# Patient Record
Sex: FEMALE | Race: WHITE | NOT HISPANIC OR LATINO | Employment: PART TIME | URBAN - METROPOLITAN AREA
[De-identification: names, ages, dates, MRNs, and addresses within clinical notes are randomized per-mention and may not be internally consistent; named-entity substitution may affect disease eponyms.]

---

## 2020-03-10 NOTE — PROGRESS NOTES
Subjective:   3/11/2020  7:55 AM  Primary care physician:No primary care provider on file.  Referring Provider: Syeda Andrews MD  Other: Lyle Galicia MD    Chief Complaint:   Chief Complaint   Patient presents with   • New Patient     tumor panceas      Diagnosis:   1. Pancreatic cyst     2. Hepatic cirrhosis, unspecified hepatic cirrhosis type, unspecified whether ascites present (HCC)     3. Abdominal pain, unspecified abdominal location     4. Abnormal CT of the abdomen         History of presenting illness:  BRAVO Cooney  is a pleasant 69 y.o. 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  She had this epigastric pain which she was seen by her primary care physician Dr. Andrews.  Ultrasound was ordered which revealed this large cystic lesion in the head of the pancreas and no sign of cholecystitis.  I have reviewed that ultrasound.  This led to a CT scan which was done at [facility] on January 24, 2020.  I have personally reviewed that imaging and it does appear to be a large cystic mass measuring greater than 4 cm at its greatest dimension.  There is a 5 to 8 mm nodule within the cystic mass that does enhance.  It does appear to be slightly complex.  There may be other areas of thickening around the cyst wall as well.  There is a question of common bile duct obstruction but I am having difficulty seeing the anatomy.  The patient does have some unusual vascular anatomy within the martín and hilum.  I suspect this is related to multiple branches but difficult to assess.  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  In any event, the patient does complain of intermittent epigastric pain radiating to the back consistent with pancreatitis as well.  The patient denies any weight  loss.  She denies any family history of pancreatic cancer or breast cancer.  Her father did have prostate cancer at age of 85.  She denies ever being admitted to the hospital for pancreatitis, she drinks 1 beer a day, she does not smoke.  She has had no other malignancies.  She did undergo an endoscopic ultrasound and the pathology report shows that it is a mucinous tumor but the biopsy of the nodule did not show any dysplasia.  There is no sign of invasive carcinoma.  This was a limited study as well.  The pancreatic duct does appear to be normal size.  She does not have an MRCP.  There is no unusual adenopathy either.  She is here with her  and sister to discuss neck steps.  I have also spoken to her gastroenterologist and Dr. Bruner from radiation oncology at [facility].      No past medical history on file.  No past surgical history on file.  No Known Allergies  Outpatient Encounter Medications as of 3/11/2020   Medication Sig Dispense Refill   • lisinopril (PRINIVIL) 20 MG Tab Take 20 mg by mouth every day.     • metoprolol SR (TOPROL XL) 25 MG TABLET SR 24 HR Take 25 mg by mouth every day.     • simvastatin (ZOCOR) 40 MG Tab Take 40 mg by mouth every evening.     • AMOXICILLIN PO Take 1,000 mg by mouth every day.     • clarithromycin (BIAXIN) 500 MG Tab Take 500 mg by mouth 2 times a day.     • lansoprazole (PREVACID) 30 MG CAPSULE DELAYED RELEASE Take 30 mg by mouth every day.     • Calcium Carb-Cholecalciferol (CALCIUM 500+D3 PO) Take  by mouth.     • lisinopril-hydrochlorothiazide (PRINZIDE) 20-25 MG per tablet Take 1 Tab by mouth every day.       No facility-administered encounter medications on file as of 3/11/2020.      Social History     Socioeconomic History   • Marital status:      Spouse name: Not on file   • Number of children: Not on file   • Years of education: Not on file   • Highest education level: Not on file   Occupational History   • Not on file   Social Needs    "  • Financial resource strain: Not on file   • Food insecurity     Worry: Not on file     Inability: Not on file   • Transportation needs     Medical: Not on file     Non-medical: Not on file   Tobacco Use   • Smoking status: Not on file   Substance and Sexual Activity   • Alcohol use: Not on file   • Drug use: Not on file   • Sexual activity: Not on file   Lifestyle   • Physical activity     Days per week: Not on file     Minutes per session: Not on file   • Stress: Not on file   Relationships   • Social connections     Talks on phone: Not on file     Gets together: Not on file     Attends Mosque service: Not on file     Active member of club or organization: Not on file     Attends meetings of clubs or organizations: Not on file     Relationship status: Not on file   • Intimate partner violence     Fear of current or ex partner: Not on file     Emotionally abused: Not on file     Physically abused: Not on file     Forced sexual activity: Not on file   Other Topics Concern   • Not on file   Social History Narrative   • Not on file      Social History     Tobacco Use   Smoking Status Not on file     Social History     Substance and Sexual Activity   Alcohol Use Not on file     Social History     Substance and Sexual Activity   Drug Use Not on file        No family history on file.  No pertinent family history on file    Review of Systems   Respiratory: Positive for cough.    Gastrointestinal: Positive for abdominal pain.   Skin: Positive for rash.   All other systems reviewed and are negative.       Objective:   /78 (BP Location: Left arm, Patient Position: Sitting, BP Cuff Size: Adult)   Pulse 92   Temp 36.9 °C (98.5 °F) (Temporal)   Ht 1.626 m (5' 4\")   Wt 69.4 kg (153 lb)   SpO2 94%   BMI 26.26 kg/m²     Physical Exam   Constitutional: She is oriented to person, place, and time. She appears well-developed and well-nourished.   HENT:   Head: Normocephalic and atraumatic.   Eyes: Pupils are equal, " round, and reactive to light. Conjunctivae are normal.   Neck: Normal range of motion. Neck supple.   Cardiovascular: Normal rate and regular rhythm.   Pulmonary/Chest: Effort normal and breath sounds normal.   Abdominal: Soft. Bowel sounds are normal.   Musculoskeletal: Normal range of motion.   Neurological: She is alert and oriented to person, place, and time.   Skin: Skin is warm and dry.   Psychiatric: She has a normal mood and affect. Her behavior is normal. Judgment and thought content normal.   Nursing note and vitals reviewed.      Labs:  NA    Imaging:  Per my read, 1/24/20 CT      Pathology: 2/25/20        Procedures: 2/25/20        Diagnosis:     1. Pancreatic cyst     2. Hepatic cirrhosis, unspecified hepatic cirrhosis type, unspecified whether ascites present (HCC)     3. Abdominal pain, unspecified abdominal location     4. Abnormal CT of the abdomen             Medical Decision Making:  Today's Assessment / Status / Plan:     In light of the present findings, the patient has what appears to be a mucinous tumor greater than 3 cm that is symptomatic with mural nodules causing intermittent abdominal pain.  The biopsies did not come back with a dysplasia but there is a fairly large nodule visualized on the CT scan.  It is complex as well.  She fits all criteria for a Whipple resection but my concern is her anatomy.  I am recommending the patient get an MRCP so that we can evaluate the anatomy and its relationship to the pancreatic duct and bile duct.  We spent time discussing what a Whipple was and I have given her information to read about what a Whipple is.  The patient will see me after she completes her MRCP.    I, Dr. Perez have entered, reviewed and confirmed the above diagnoses related to this patient on this date of service, 3/11/2020  7:55 AM.    She agreed and verbalized her agreement and understanding with the current plan. I answered all questions and concerns she has at this time and  advised her to call at any time in the interim with questions or concerns in regards to her care.    Thank you for allowing me to participate in her care, I will continue to follow closely.       Please note that this dictation was created using voice recognition software. I have made every reasonable attempt to correct obvious errors, but I expect that there are errors of grammar and possibly content that I did not discover before finalizing the note.     Thank you for this consultation and allowing me to participate in your patient's care. If I can be of further service please contact my office.

## 2020-03-11 ENCOUNTER — OFFICE VISIT (OUTPATIENT)
Dept: SURGICAL ONCOLOGY | Facility: MEDICAL CENTER | Age: 70
End: 2020-03-11
Payer: COMMERCIAL

## 2020-03-11 VITALS
SYSTOLIC BLOOD PRESSURE: 122 MMHG | BODY MASS INDEX: 26.12 KG/M2 | TEMPERATURE: 98.5 F | OXYGEN SATURATION: 94 % | DIASTOLIC BLOOD PRESSURE: 78 MMHG | HEART RATE: 92 BPM | WEIGHT: 153 LBS | HEIGHT: 64 IN

## 2020-03-11 DIAGNOSIS — K86.2 PANCREATIC CYST: ICD-10-CM

## 2020-03-11 DIAGNOSIS — R93.5 ABNORMAL CT OF THE ABDOMEN: ICD-10-CM

## 2020-03-11 DIAGNOSIS — D49.0 IPMN (INTRADUCTAL PAPILLARY MUCINOUS NEOPLASM): ICD-10-CM

## 2020-03-11 DIAGNOSIS — K86.89 PANCREATIC MASS: ICD-10-CM

## 2020-03-11 DIAGNOSIS — R10.9 ABDOMINAL PAIN, UNSPECIFIED ABDOMINAL LOCATION: ICD-10-CM

## 2020-03-11 PROBLEM — K74.60 HEPATIC CIRRHOSIS (HCC): Status: ACTIVE | Noted: 2020-03-11

## 2020-03-11 PROCEDURE — 99205 OFFICE O/P NEW HI 60 MIN: CPT | Performed by: SURGERY

## 2020-03-11 RX ORDER — METOPROLOL SUCCINATE 25 MG/1
25 TABLET, EXTENDED RELEASE ORAL DAILY
COMMUNITY

## 2020-03-11 RX ORDER — LANSOPRAZOLE 30 MG/1
30 CAPSULE, DELAYED RELEASE ORAL DAILY
COMMUNITY
End: 2020-03-26

## 2020-03-11 RX ORDER — LISINOPRIL AND HYDROCHLOROTHIAZIDE 25; 20 MG/1; MG/1
1 TABLET ORAL DAILY
COMMUNITY

## 2020-03-11 RX ORDER — LISINOPRIL 20 MG/1
20 TABLET ORAL EVERY EVENING
COMMUNITY

## 2020-03-11 RX ORDER — CLARITHROMYCIN 500 MG/1
500 TABLET, COATED ORAL 2 TIMES DAILY
COMMUNITY
End: 2020-03-26

## 2020-03-11 RX ORDER — SIMVASTATIN 40 MG
40 TABLET ORAL NIGHTLY
COMMUNITY

## 2020-03-11 ASSESSMENT — ENCOUNTER SYMPTOMS
ABDOMINAL PAIN: 1
COUGH: 1

## 2020-03-23 NOTE — PROGRESS NOTES
Subjective:   3/24/2020  7:43 AM  Primary care physician:Syeda Andrews M.D.  Referring Provider: Syeda Andrews MD  Other: Lyle Galicia MD       Chief Complaint:   Chief Complaint   Patient presents with   • Follow-Up     [facility] MRCP IPMN     Diagnosis:   1. IPMN (intraductal papillary mucinous neoplasm)     2. Pancreatic mass     3. Abdominal pain, unspecified abdominal location         History of presenting illness:  BRAVO Cooney  is a pleasant 69 y.o. year old female who presented with follow-up MRCP to evaluate the biliary system.  On the MRCP dated March 20, 2020 appears the patient has a 4.3 cm cystic mass in the martín hepatis with a soft tissue component.  Initially it does appear to be arising from the head of the pancreas but this could also represent a choledochocyst that may have had malignant transformation.  There is a large mural nodule measuring almost 2cm arising from the cystic mass which may point again to a choledochocele at the level of the common bile duct which has converted to malignancy and causing this apparent impending obstruction.  There is no sign of metastatic disease or adenopathy.  The patient also had a colonoscopy on March 10, 2020 which revealed a polyp with high-grade dysplasia.  Fortunately her gastroenterologist was able to do a mucosal resection and he feels confident that it is completely resected and not needing a colectomy.  I have personally spoken to the gastroenterologist regarding this concern.  In the meantime she is here with her family to discuss neck steps and surgery.      No past medical history on file.  No past surgical history on file.  No Known Allergies  Outpatient Encounter Medications as of 3/24/2020   Medication Sig Dispense Refill   • lisinopril (PRINIVIL) 20 MG Tab Take 20 mg by mouth every day.     • metoprolol SR (TOPROL XL) 25 MG TABLET SR 24 HR Take 25 mg by mouth every day.     • simvastatin (ZOCOR) 40 MG Tab Take 40 mg by mouth every evening.     •  AMOXICILLIN PO Take 1,000 mg by mouth every day.     • clarithromycin (BIAXIN) 500 MG Tab Take 500 mg by mouth 2 times a day.     • lansoprazole (PREVACID) 30 MG CAPSULE DELAYED RELEASE Take 30 mg by mouth every day.     • Calcium Carb-Cholecalciferol (CALCIUM 500+D3 PO) Take  by mouth.     • lisinopril-hydrochlorothiazide (PRINZIDE) 20-25 MG per tablet Take 1 Tab by mouth every day.       No facility-administered encounter medications on file as of 3/24/2020.      Social History     Socioeconomic History   • Marital status:      Spouse name: Not on file   • Number of children: Not on file   • Years of education: Not on file   • Highest education level: Not on file   Occupational History   • Not on file   Social Needs   • Financial resource strain: Not on file   • Food insecurity     Worry: Not on file     Inability: Not on file   • Transportation needs     Medical: Not on file     Non-medical: Not on file   Tobacco Use   • Smoking status: Not on file   Substance and Sexual Activity   • Alcohol use: Not on file   • Drug use: Not on file   • Sexual activity: Not on file   Lifestyle   • Physical activity     Days per week: Not on file     Minutes per session: Not on file   • Stress: Not on file   Relationships   • Social connections     Talks on phone: Not on file     Gets together: Not on file     Attends Protestant service: Not on file     Active member of club or organization: Not on file     Attends meetings of clubs or organizations: Not on file     Relationship status: Not on file   • Intimate partner violence     Fear of current or ex partner: Not on file     Emotionally abused: Not on file     Physically abused: Not on file     Forced sexual activity: Not on file   Other Topics Concern   • Not on file   Social History Narrative   • Not on file      Social History     Tobacco Use   Smoking Status Not on file     Social History     Substance and Sexual Activity   Alcohol Use Not on file     Social History   "    Substance and Sexual Activity   Drug Use Not on file        No family history on file.  No pertinent family history on file    Review of Systems   Gastrointestinal: Positive for abdominal pain.   All other systems reviewed and are negative.       Objective:   /78 (BP Location: Left arm, Patient Position: Sitting, BP Cuff Size: Adult)   Pulse 88   Temp 36.7 °C (98.1 °F) (Temporal)   Ht 1.626 m (5' 4\")   Wt 68 kg (150 lb)   SpO2 97%   BMI 25.75 kg/m²     Physical Exam   Constitutional: She is oriented to person, place, and time. She appears well-developed and well-nourished.   HENT:   Head: Normocephalic and atraumatic.   Eyes: Pupils are equal, round, and reactive to light. Conjunctivae are normal.   Neck: Normal range of motion. Neck supple.   Cardiovascular: Normal rate and regular rhythm.   Pulmonary/Chest: Effort normal and breath sounds normal.   Abdominal: Soft.   Musculoskeletal: Normal range of motion.   Neurological: She is alert and oriented to person, place, and time.   Skin: Skin is warm and dry.   Psychiatric: She has a normal mood and affect. Her behavior is normal. Judgment and thought content normal.   Nursing note and vitals reviewed.      Labs:  NA    Imaging:  Per my read, MRCP 3/20/20 [facility]        Pathology: 2/25/20          Procedures: 2/25/20            Diagnosis:     1. IPMN (intraductal papillary mucinous neoplasm)     2. Pancreatic mass     3. Abdominal pain, unspecified abdominal location             Medical Decision Making:  Today's Assessment / Status / Plan:     In light of the present findings, I am still unclear about the anatomy and why it appears as though there is a biliary obstruction occurring just below the hilum at the level of the hepatic duct at the insertion of the gallbladder cystic duct.  There is always a concern that this could be harboring a malignancy like gallbladder carcinoma because of the mid duct obstruction but in the background she has this large " cystic lesion with a large mural nodule at the same location.  The biopsies were negative for any sign of malignancy but my concern is it is harboring a malignancy.  The patient would need a Whipple procedure including possible resection all the way up to the hilum.  There is no sign of metastatic disease thus I feel we will go forward with surgery.  I have spoken to her gastroenterologist as well and he feels that he got the high-grade dysplastic polyp out completely and there is no worry about the ascending colon.  We discussed the risks and benefits of a Whipple procedure including bleeding, infection, the possibility of an anastomotic leak at the pancreas of 25%, leak from the biliary and gastric anastomosis of 2 to 4%, hospital stay in 7 and 10 days, the use of an epidural, ICU stay, multiple drains, a mortality of 1 to 2%, finding metastatic disease that is a cold, and finding that we cannot remove this tumor due to malignant involvement of the portal venous system.  The patient understood this and is agreed the above plan.  The patient be scheduled as soon as feasible possibly Monday, March 30, 2020.  We will be requesting to do this surgery at [facility] due to the leapfrog data showing that the surgery must be done at a high volume center by high-volume surgeon.  Also because of the most recent coronavirus changes in the operating room, we are doing all large cancer surgeries at a single institution.  Elective surgeries are not able to be scheduled at [facility].    I, Dr. Perez have entered, reviewed and confirmed the above diagnoses related to this patient on this date of service, 3/24/2020  7:43 AM.    She agreed and verbalized her agreement and understanding with the current plan. I answered all questions and concerns she has at this time and advised her to call at any time in the interim with questions or concerns in regards to her care.    Thank you for  allowing me to participate in her care, I will continue to follow closely.       Please note that this dictation was created using voice recognition software. I have made every reasonable attempt to correct obvious errors, but I expect that there are errors of grammar and possibly content that I did not discover before finalizing the note.     Thank you for this consultation and allowing me to participate in your patient's care. If I can be of further service please contact my office.

## 2020-03-24 ENCOUNTER — OFFICE VISIT (OUTPATIENT)
Dept: SURGICAL ONCOLOGY | Facility: MEDICAL CENTER | Age: 70
End: 2020-03-24
Payer: COMMERCIAL

## 2020-03-24 VITALS
WEIGHT: 150 LBS | OXYGEN SATURATION: 97 % | HEART RATE: 88 BPM | TEMPERATURE: 98.1 F | BODY MASS INDEX: 25.61 KG/M2 | SYSTOLIC BLOOD PRESSURE: 122 MMHG | HEIGHT: 64 IN | DIASTOLIC BLOOD PRESSURE: 78 MMHG

## 2020-03-24 DIAGNOSIS — R93.5 ABNORMAL CT OF THE ABDOMEN: ICD-10-CM

## 2020-03-24 DIAGNOSIS — R10.9 ABDOMINAL PAIN, UNSPECIFIED ABDOMINAL LOCATION: ICD-10-CM

## 2020-03-24 DIAGNOSIS — K86.89 PANCREATIC MASS: ICD-10-CM

## 2020-03-24 DIAGNOSIS — D49.0 IPMN (INTRADUCTAL PAPILLARY MUCINOUS NEOPLASM): ICD-10-CM

## 2020-03-24 PROCEDURE — 99215 OFFICE O/P EST HI 40 MIN: CPT | Performed by: SURGERY

## 2020-03-24 ASSESSMENT — ENCOUNTER SYMPTOMS: ABDOMINAL PAIN: 1

## 2020-03-26 DIAGNOSIS — Z01.812 PRE-OPERATIVE LABORATORY EXAMINATION: ICD-10-CM

## 2020-03-26 DIAGNOSIS — Z01.83 ENCOUNTER FOR BLOOD TYPING: ICD-10-CM

## 2020-03-26 DIAGNOSIS — Z01.810 PRE-OPERATIVE CARDIOVASCULAR EXAMINATION: ICD-10-CM

## 2020-03-26 LAB
ABO GROUP BLD: NORMAL
ALBUMIN SERPL BCP-MCNC: 4.3 G/DL (ref 3.2–4.9)
ALBUMIN/GLOB SERPL: 1.7 G/DL
ALP SERPL-CCNC: 51 U/L (ref 30–99)
ALT SERPL-CCNC: 31 U/L (ref 2–50)
ANION GAP SERPL CALC-SCNC: 12 MMOL/L (ref 7–16)
AST SERPL-CCNC: 29 U/L (ref 12–45)
BASOPHILS # BLD AUTO: 1 % (ref 0–1.8)
BASOPHILS # BLD: 0.04 K/UL (ref 0–0.12)
BILIRUB SERPL-MCNC: 0.5 MG/DL (ref 0.1–1.5)
BLD GP AB SCN SERPL QL: NORMAL
BUN SERPL-MCNC: 9 MG/DL (ref 8–22)
CALCIUM SERPL-MCNC: 9 MG/DL (ref 8.5–10.5)
CHLORIDE SERPL-SCNC: 103 MMOL/L (ref 96–112)
CO2 SERPL-SCNC: 24 MMOL/L (ref 20–33)
CREAT SERPL-MCNC: 0.62 MG/DL (ref 0.5–1.4)
EKG IMPRESSION: NORMAL
EOSINOPHIL # BLD AUTO: 0.11 K/UL (ref 0–0.51)
EOSINOPHIL NFR BLD: 2.6 % (ref 0–6.9)
ERYTHROCYTE [DISTWIDTH] IN BLOOD BY AUTOMATED COUNT: 45.4 FL (ref 35.9–50)
GLOBULIN SER CALC-MCNC: 2.5 G/DL (ref 1.9–3.5)
GLUCOSE SERPL-MCNC: 77 MG/DL (ref 65–99)
HCT VFR BLD AUTO: 38.6 % (ref 37–47)
HGB BLD-MCNC: 13 G/DL (ref 12–16)
IMM GRANULOCYTES # BLD AUTO: 0.01 K/UL (ref 0–0.11)
IMM GRANULOCYTES NFR BLD AUTO: 0.2 % (ref 0–0.9)
INR PPP: 0.96 (ref 0.87–1.13)
LYMPHOCYTES # BLD AUTO: 1.17 K/UL (ref 1–4.8)
LYMPHOCYTES NFR BLD: 28.1 % (ref 22–41)
MCH RBC QN AUTO: 31.4 PG (ref 27–33)
MCHC RBC AUTO-ENTMCNC: 33.7 G/DL (ref 33.6–35)
MCV RBC AUTO: 93.2 FL (ref 81.4–97.8)
MONOCYTES # BLD AUTO: 0.3 K/UL (ref 0–0.85)
MONOCYTES NFR BLD AUTO: 7.2 % (ref 0–13.4)
NEUTROPHILS # BLD AUTO: 2.54 K/UL (ref 2–7.15)
NEUTROPHILS NFR BLD: 60.9 % (ref 44–72)
NRBC # BLD AUTO: 0 K/UL
NRBC BLD-RTO: 0 /100 WBC
PLATELET # BLD AUTO: 276 K/UL (ref 164–446)
PMV BLD AUTO: 10.1 FL (ref 9–12.9)
POTASSIUM SERPL-SCNC: 3.4 MMOL/L (ref 3.6–5.5)
PROT SERPL-MCNC: 6.8 G/DL (ref 6–8.2)
PROTHROMBIN TIME: 13 SEC (ref 12–14.6)
RBC # BLD AUTO: 4.14 M/UL (ref 4.2–5.4)
RH BLD: NORMAL
SODIUM SERPL-SCNC: 139 MMOL/L (ref 135–145)
WBC # BLD AUTO: 4.2 K/UL (ref 4.8–10.8)

## 2020-03-26 PROCEDURE — 36415 COLL VENOUS BLD VENIPUNCTURE: CPT

## 2020-03-26 PROCEDURE — 86901 BLOOD TYPING SEROLOGIC RH(D): CPT

## 2020-03-26 PROCEDURE — 93010 ELECTROCARDIOGRAM REPORT: CPT | Performed by: INTERNAL MEDICINE

## 2020-03-26 PROCEDURE — 85025 COMPLETE CBC W/AUTO DIFF WBC: CPT

## 2020-03-26 PROCEDURE — 85610 PROTHROMBIN TIME: CPT

## 2020-03-26 PROCEDURE — 80053 COMPREHEN METABOLIC PANEL: CPT

## 2020-03-26 PROCEDURE — 86900 BLOOD TYPING SEROLOGIC ABO: CPT

## 2020-03-26 PROCEDURE — 93005 ELECTROCARDIOGRAM TRACING: CPT

## 2020-03-26 PROCEDURE — 86850 RBC ANTIBODY SCREEN: CPT

## 2020-03-26 RX ORDER — M-VIT,TX,IRON,MINS/CALC/FOLIC 27MG-0.4MG
1 TABLET ORAL EVERY EVENING
Status: ON HOLD | COMMUNITY
End: 2020-04-03

## 2020-03-29 NOTE — PROGRESS NOTES
COVID-19 Pre-surgery screenin. Do you have an undiagnosed respiratory illness or symptoms such as coughing or sneezing?  (Yes/No) NO  a. Onset of Sx   b. Acute vs. chronic respiratory illness     2. Do you have an unexplained fever greater than 100.4 degrees Fahrenheit or 38 degrees Celsius?     NO (Yes/No)    3. Have you had direct exposure to a patient who tested positive for Covid-19?    NO (Yes/No)    4. Have you traveled within the last 14 days to [place], [place], [place], [place], or [place]?    NO (Yes/No)    Pt informed of visitor policy

## 2020-03-30 ENCOUNTER — HOSPITAL ENCOUNTER (OUTPATIENT)
Facility: MEDICAL CENTER | Age: 70
End: 2020-03-30
Attending: SURGERY | Admitting: SURGERY
Payer: COMMERCIAL

## 2020-03-30 ENCOUNTER — ANESTHESIA (OUTPATIENT)
Dept: SURGERY | Facility: MEDICAL CENTER | Age: 70
End: 2020-03-30
Payer: COMMERCIAL

## 2020-03-30 ENCOUNTER — ANESTHESIA EVENT (OUTPATIENT)
Dept: SURGERY | Facility: MEDICAL CENTER | Age: 70
End: 2020-03-30
Payer: COMMERCIAL

## 2020-03-30 VITALS
WEIGHT: 149.47 LBS | TEMPERATURE: 97.9 F | DIASTOLIC BLOOD PRESSURE: 87 MMHG | BODY MASS INDEX: 25.52 KG/M2 | RESPIRATION RATE: 16 BRPM | HEART RATE: 80 BPM | OXYGEN SATURATION: 95 % | HEIGHT: 64 IN | SYSTOLIC BLOOD PRESSURE: 137 MMHG

## 2020-03-30 LAB — ABO + RH BLD: NORMAL

## 2020-03-30 PROCEDURE — 700105 HCHG RX REV CODE 258: Performed by: SURGERY

## 2020-03-30 PROCEDURE — 700101 HCHG RX REV CODE 250: Performed by: SURGERY

## 2020-03-30 RX ORDER — SODIUM CHLORIDE, SODIUM LACTATE, POTASSIUM CHLORIDE, CALCIUM CHLORIDE 600; 310; 30; 20 MG/100ML; MG/100ML; MG/100ML; MG/100ML
INJECTION, SOLUTION INTRAVENOUS CONTINUOUS
Status: DISCONTINUED | OUTPATIENT
Start: 2020-03-30 | End: 2020-03-30 | Stop reason: HOSPADM

## 2020-03-30 RX ADMIN — LIDOCAINE HYDROCHLORIDE 0.5 ML: 10 INJECTION, SOLUTION EPIDURAL; INFILTRATION; INTRACAUDAL at 06:07

## 2020-03-30 RX ADMIN — SODIUM CHLORIDE, POTASSIUM CHLORIDE, SODIUM LACTATE AND CALCIUM CHLORIDE: 600; 310; 30; 20 INJECTION, SOLUTION INTRAVENOUS at 06:08

## 2020-03-30 ASSESSMENT — FIBROSIS 4 INDEX: FIB4 SCORE: 1.32

## 2020-03-30 NOTE — OR NURSING
0715 - Authorization not received for surgery.  Dr. FELDER in speaking with pt; will contact pt to reschedule surgery date and time.    0725 - States she would like to wait downstairs for her . Pt walked down to Aydin sharif by Domenica ARNOLD. No further needs.

## 2020-03-30 NOTE — ANESTHESIA PREPROCEDURE EVALUATION
Relevant Problems   No relevant active problems       Physical Exam    Airway   Mallampati: II  TM distance: >3 FB  Neck ROM: full       Cardiovascular - normal exam  Rhythm: regular  Rate: normal  (-) murmur     Dental - normal exam         Pulmonary - normal exam  Breath sounds clear to auscultation     Abdominal    Neurological - normal exam                 Anesthesia Plan    ASA 2       Plan - general and epidural   Neuraxial block will be post-op pain control    Airway plan will be ETT        Induction: intravenous    Postoperative Plan: Postoperative administration of opioids is intended.    Pertinent diagnostic labs and testing reviewed    Informed Consent:    Anesthetic plan and risks discussed with patient.    Use of blood products discussed with: patient whom consented to blood products.

## 2020-04-03 ENCOUNTER — HOSPITAL ENCOUNTER (INPATIENT)
Facility: MEDICAL CENTER | Age: 70
LOS: 8 days | DRG: 406 | End: 2020-04-11
Attending: SURGERY | Admitting: SURGERY
Payer: COMMERCIAL

## 2020-04-03 ENCOUNTER — ANESTHESIA (OUTPATIENT)
Dept: SURGERY | Facility: MEDICAL CENTER | Age: 70
DRG: 406 | End: 2020-04-03
Payer: COMMERCIAL

## 2020-04-03 ENCOUNTER — ANESTHESIA EVENT (OUTPATIENT)
Dept: SURGERY | Facility: MEDICAL CENTER | Age: 70
DRG: 406 | End: 2020-04-03
Payer: COMMERCIAL

## 2020-04-03 DIAGNOSIS — C22.1 CHOLANGIOCARCINOMA OF BILIARY TRACT (HCC): ICD-10-CM

## 2020-04-03 PROBLEM — D37.8 NEOPLASM OF UNCERTAIN BEHAVIOR OF HEAD OF PANCREAS: Status: ACTIVE | Noted: 2020-04-03

## 2020-04-03 LAB
ABO GROUP BLD: NORMAL
BLD GP AB SCN SERPL QL: NORMAL
GRAM STN SPEC: NORMAL
PATHOLOGY CONSULT NOTE: NORMAL
RH BLD: NORMAL
SIGNIFICANT IND 70042: NORMAL
SITE SITE: NORMAL
SOURCE SOURCE: NORMAL

## 2020-04-03 PROCEDURE — 160042 HCHG SURGERY MINUTES - EA ADDL 1 MIN LEVEL 5: Performed by: SURGERY

## 2020-04-03 PROCEDURE — C2617 STENT, NON-COR, TEM W/O DEL: HCPCS | Performed by: SURGERY

## 2020-04-03 PROCEDURE — 500389 HCHG DRAIN, RESERVOIR SUCT JP 100CC: Performed by: SURGERY

## 2020-04-03 PROCEDURE — 88307 TISSUE EXAM BY PATHOLOGIST: CPT | Mod: 59

## 2020-04-03 PROCEDURE — A6402 STERILE GAUZE <= 16 SQ IN: HCPCS | Performed by: SURGERY

## 2020-04-03 PROCEDURE — 501838 HCHG SUTURE GENERAL: Performed by: SURGERY

## 2020-04-03 PROCEDURE — 700105 HCHG RX REV CODE 258: Performed by: SURGERY

## 2020-04-03 PROCEDURE — 700105 HCHG RX REV CODE 258: Performed by: ANESTHESIOLOGY

## 2020-04-03 PROCEDURE — 160048 HCHG OR STATISTICAL LEVEL 1-5: Performed by: SURGERY

## 2020-04-03 PROCEDURE — 86850 RBC ANTIBODY SCREEN: CPT

## 2020-04-03 PROCEDURE — 160009 HCHG ANES TIME/MIN: Performed by: SURGERY

## 2020-04-03 PROCEDURE — 160035 HCHG PACU - 1ST 60 MINS PHASE I: Performed by: SURGERY

## 2020-04-03 PROCEDURE — 501445 HCHG STAPLER, SKIN DISP: Performed by: SURGERY

## 2020-04-03 PROCEDURE — 0FBG0ZZ EXCISION OF PANCREAS, OPEN APPROACH: ICD-10-PCS | Performed by: SURGERY

## 2020-04-03 PROCEDURE — 0DT90ZZ RESECTION OF DUODENUM, OPEN APPROACH: ICD-10-PCS | Performed by: SURGERY

## 2020-04-03 PROCEDURE — A6404 STERILE GAUZE > 48 SQ IN: HCPCS | Performed by: SURGERY

## 2020-04-03 PROCEDURE — 86900 BLOOD TYPING SEROLOGIC ABO: CPT

## 2020-04-03 PROCEDURE — 87205 SMEAR GRAM STAIN: CPT

## 2020-04-03 PROCEDURE — 500380 HCHG DRAIN, PENROSE 1/4X12: Performed by: SURGERY

## 2020-04-03 PROCEDURE — 07BD0ZZ EXCISION OF AORTIC LYMPHATIC, OPEN APPROACH: ICD-10-PCS | Performed by: SURGERY

## 2020-04-03 PROCEDURE — 500378 HCHG DRAIN, J-VAC ROUND 19FR: Performed by: SURGERY

## 2020-04-03 PROCEDURE — 700101 HCHG RX REV CODE 250: Performed by: SURGERY

## 2020-04-03 PROCEDURE — P9047 ALBUMIN (HUMAN), 25%, 50ML: HCPCS | Performed by: SURGERY

## 2020-04-03 PROCEDURE — 0DB60ZZ EXCISION OF STOMACH, OPEN APPROACH: ICD-10-PCS | Performed by: SURGERY

## 2020-04-03 PROCEDURE — 62322 NJX INTERLAMINAR LMBR/SAC: CPT | Performed by: SURGERY

## 2020-04-03 PROCEDURE — 87075 CULTR BACTERIA EXCEPT BLOOD: CPT

## 2020-04-03 PROCEDURE — 0FT40ZZ RESECTION OF GALLBLADDER, OPEN APPROACH: ICD-10-PCS | Performed by: SURGERY

## 2020-04-03 PROCEDURE — C9113 INJ PANTOPRAZOLE SODIUM, VIA: HCPCS | Performed by: SURGERY

## 2020-04-03 PROCEDURE — 86901 BLOOD TYPING SEROLOGIC RH(D): CPT

## 2020-04-03 PROCEDURE — 160002 HCHG RECOVERY MINUTES (STAT): Performed by: SURGERY

## 2020-04-03 PROCEDURE — 0FB90ZZ EXCISION OF COMMON BILE DUCT, OPEN APPROACH: ICD-10-PCS | Performed by: SURGERY

## 2020-04-03 PROCEDURE — 88304 TISSUE EXAM BY PATHOLOGIST: CPT

## 2020-04-03 PROCEDURE — 48150 PARTIAL REMOVAL OF PANCREAS: CPT | Performed by: SURGERY

## 2020-04-03 PROCEDURE — 88305 TISSUE EXAM BY PATHOLOGIST: CPT

## 2020-04-03 PROCEDURE — P9045 ALBUMIN (HUMAN), 5%, 250 ML: HCPCS | Performed by: ANESTHESIOLOGY

## 2020-04-03 PROCEDURE — 770022 HCHG ROOM/CARE - ICU (200)

## 2020-04-03 PROCEDURE — 700111 HCHG RX REV CODE 636 W/ 250 OVERRIDE (IP): Performed by: SURGERY

## 2020-04-03 PROCEDURE — 700111 HCHG RX REV CODE 636 W/ 250 OVERRIDE (IP): Performed by: ANESTHESIOLOGY

## 2020-04-03 PROCEDURE — 700101 HCHG RX REV CODE 250: Performed by: ANESTHESIOLOGY

## 2020-04-03 PROCEDURE — 160031 HCHG SURGERY MINUTES - 1ST 30 MINS LEVEL 5: Performed by: SURGERY

## 2020-04-03 PROCEDURE — 0FB00ZZ EXCISION OF LIVER, OPEN APPROACH: ICD-10-PCS | Performed by: SURGERY

## 2020-04-03 PROCEDURE — 501435 HCHG STAPLER, LINEAR 60: Performed by: SURGERY

## 2020-04-03 PROCEDURE — 99233 SBSQ HOSP IP/OBS HIGH 50: CPT | Performed by: SURGERY

## 2020-04-03 PROCEDURE — 51798 US URINE CAPACITY MEASURE: CPT

## 2020-04-03 PROCEDURE — 88331 PATH CONSLTJ SURG 1 BLK 1SPC: CPT | Mod: 91

## 2020-04-03 PROCEDURE — 76998 US GUIDE INTRAOP: CPT | Mod: 26 | Performed by: SURGERY

## 2020-04-03 PROCEDURE — 502240 HCHG MISC OR SUPPLY RC 0272: Performed by: SURGERY

## 2020-04-03 PROCEDURE — 501443 HCHG STAPLER, ROTIC. FLEX: Performed by: SURGERY

## 2020-04-03 PROCEDURE — C1725 CATH, TRANSLUMIN NON-LASER: HCPCS | Performed by: SURGERY

## 2020-04-03 PROCEDURE — 38747 REMOVE ABDOMINAL LYMPH NODES: CPT | Mod: 59 | Performed by: SURGERY

## 2020-04-03 PROCEDURE — 88309 TISSUE EXAM BY PATHOLOGIST: CPT | Mod: 59

## 2020-04-03 PROCEDURE — 87070 CULTURE OTHR SPECIMN AEROBIC: CPT

## 2020-04-03 PROCEDURE — 47100 WEDGE BIOPSY OF LIVER: CPT | Mod: 51 | Performed by: SURGERY

## 2020-04-03 PROCEDURE — 49905 OMENTAL FLAP INTRA-ABDOM: CPT | Performed by: SURGERY

## 2020-04-03 DEVICE — IMPLANTABLE DEVICE: Type: IMPLANTABLE DEVICE | Site: ABDOMEN | Status: FUNCTIONAL

## 2020-04-03 RX ORDER — ALBUMIN, HUMAN INJ 5% 5 %
SOLUTION INTRAVENOUS PRN
Status: DISCONTINUED | OUTPATIENT
Start: 2020-04-03 | End: 2020-04-03 | Stop reason: SURG

## 2020-04-03 RX ORDER — HYDRALAZINE HYDROCHLORIDE 20 MG/ML
5 INJECTION INTRAMUSCULAR; INTRAVENOUS
Status: DISCONTINUED | OUTPATIENT
Start: 2020-04-03 | End: 2020-04-03 | Stop reason: HOSPADM

## 2020-04-03 RX ORDER — ACETAMINOPHEN 500 MG
1000 TABLET ORAL EVERY 6 HOURS
Status: DISCONTINUED | OUTPATIENT
Start: 2020-04-03 | End: 2020-04-03

## 2020-04-03 RX ORDER — HALOPERIDOL 5 MG/ML
1 INJECTION INTRAMUSCULAR EVERY 6 HOURS PRN
Status: DISCONTINUED | OUTPATIENT
Start: 2020-04-03 | End: 2020-04-07

## 2020-04-03 RX ORDER — DIPHENHYDRAMINE HCL 25 MG
12.5 TABLET ORAL EVERY 6 HOURS PRN
Status: DISCONTINUED | OUTPATIENT
Start: 2020-04-03 | End: 2020-04-03

## 2020-04-03 RX ORDER — SODIUM CHLORIDE, SODIUM LACTATE, POTASSIUM CHLORIDE, CALCIUM CHLORIDE 600; 310; 30; 20 MG/100ML; MG/100ML; MG/100ML; MG/100ML
INJECTION, SOLUTION INTRAVENOUS CONTINUOUS
Status: DISCONTINUED | OUTPATIENT
Start: 2020-04-03 | End: 2020-04-07

## 2020-04-03 RX ORDER — LIDOCAINE HYDROCHLORIDE AND EPINEPHRINE 15; 5 MG/ML; UG/ML
INJECTION, SOLUTION EPIDURAL
Status: COMPLETED | OUTPATIENT
Start: 2020-04-03 | End: 2020-04-03

## 2020-04-03 RX ORDER — ONDANSETRON 2 MG/ML
4 INJECTION INTRAMUSCULAR; INTRAVENOUS
Status: DISCONTINUED | OUTPATIENT
Start: 2020-04-03 | End: 2020-04-03 | Stop reason: HOSPADM

## 2020-04-03 RX ORDER — PHENYLEPHRINE HYDROCHLORIDE 10 MG/ML
INJECTION, SOLUTION INTRAMUSCULAR; INTRAVENOUS; SUBCUTANEOUS PRN
Status: DISCONTINUED | OUTPATIENT
Start: 2020-04-03 | End: 2020-04-03 | Stop reason: SURG

## 2020-04-03 RX ORDER — ALBUMIN (HUMAN) 12.5 G/50ML
12.5 SOLUTION INTRAVENOUS ONCE
Status: COMPLETED | OUTPATIENT
Start: 2020-04-03 | End: 2020-04-03

## 2020-04-03 RX ORDER — KETOROLAC TROMETHAMINE 30 MG/ML
INJECTION, SOLUTION INTRAMUSCULAR; INTRAVENOUS PRN
Status: DISCONTINUED | OUTPATIENT
Start: 2020-04-03 | End: 2020-04-03 | Stop reason: SURG

## 2020-04-03 RX ORDER — DEXAMETHASONE SODIUM PHOSPHATE 4 MG/ML
4 INJECTION, SOLUTION INTRA-ARTICULAR; INTRALESIONAL; INTRAMUSCULAR; INTRAVENOUS; SOFT TISSUE
Status: DISCONTINUED | OUTPATIENT
Start: 2020-04-03 | End: 2020-04-06

## 2020-04-03 RX ORDER — SCOLOPAMINE TRANSDERMAL SYSTEM 1 MG/1
1 PATCH, EXTENDED RELEASE TRANSDERMAL
Status: DISCONTINUED | OUTPATIENT
Start: 2020-04-03 | End: 2020-04-07

## 2020-04-03 RX ORDER — CEFOTETAN DISODIUM 2 G/20ML
INJECTION, POWDER, FOR SOLUTION INTRAMUSCULAR; INTRAVENOUS PRN
Status: DISCONTINUED | OUTPATIENT
Start: 2020-04-03 | End: 2020-04-03 | Stop reason: SURG

## 2020-04-03 RX ORDER — PANTOPRAZOLE SODIUM 40 MG/10ML
40 INJECTION, POWDER, LYOPHILIZED, FOR SOLUTION INTRAVENOUS DAILY
Status: DISCONTINUED | OUTPATIENT
Start: 2020-04-03 | End: 2020-04-06

## 2020-04-03 RX ORDER — METOPROLOL TARTRATE 1 MG/ML
5 INJECTION, SOLUTION INTRAVENOUS
Status: DISCONTINUED | OUTPATIENT
Start: 2020-04-03 | End: 2020-04-03 | Stop reason: HOSPADM

## 2020-04-03 RX ORDER — SODIUM CHLORIDE, SODIUM LACTATE, POTASSIUM CHLORIDE, CALCIUM CHLORIDE 600; 310; 30; 20 MG/100ML; MG/100ML; MG/100ML; MG/100ML
INJECTION, SOLUTION INTRAVENOUS CONTINUOUS
Status: ACTIVE | OUTPATIENT
Start: 2020-04-03 | End: 2020-04-03

## 2020-04-03 RX ORDER — DEXAMETHASONE SODIUM PHOSPHATE 4 MG/ML
INJECTION, SOLUTION INTRA-ARTICULAR; INTRALESIONAL; INTRAMUSCULAR; INTRAVENOUS; SOFT TISSUE PRN
Status: DISCONTINUED | OUTPATIENT
Start: 2020-04-03 | End: 2020-04-03 | Stop reason: SURG

## 2020-04-03 RX ORDER — MEPERIDINE HYDROCHLORIDE 25 MG/ML
12.5 INJECTION INTRAMUSCULAR; INTRAVENOUS; SUBCUTANEOUS
Status: DISCONTINUED | OUTPATIENT
Start: 2020-04-03 | End: 2020-04-03 | Stop reason: HOSPADM

## 2020-04-03 RX ORDER — SODIUM CHLORIDE, SODIUM LACTATE, POTASSIUM CHLORIDE, CALCIUM CHLORIDE 600; 310; 30; 20 MG/100ML; MG/100ML; MG/100ML; MG/100ML
INJECTION, SOLUTION INTRAVENOUS CONTINUOUS
Status: DISCONTINUED | OUTPATIENT
Start: 2020-04-03 | End: 2020-04-03 | Stop reason: HOSPADM

## 2020-04-03 RX ORDER — ACETAMINOPHEN 650 MG/1
975 SUPPOSITORY RECTAL EVERY 6 HOURS
Status: DISCONTINUED | OUTPATIENT
Start: 2020-04-03 | End: 2020-04-11 | Stop reason: HOSPADM

## 2020-04-03 RX ORDER — ONDANSETRON 2 MG/ML
4 INJECTION INTRAMUSCULAR; INTRAVENOUS EVERY 4 HOURS PRN
Status: DISCONTINUED | OUTPATIENT
Start: 2020-04-03 | End: 2020-04-11 | Stop reason: HOSPADM

## 2020-04-03 RX ORDER — ONDANSETRON 2 MG/ML
INJECTION INTRAMUSCULAR; INTRAVENOUS PRN
Status: DISCONTINUED | OUTPATIENT
Start: 2020-04-03 | End: 2020-04-03 | Stop reason: SURG

## 2020-04-03 RX ORDER — SODIUM CHLORIDE 9 MG/ML
250 INJECTION, SOLUTION INTRAVENOUS ONCE
Status: COMPLETED | OUTPATIENT
Start: 2020-04-03 | End: 2020-04-03

## 2020-04-03 RX ORDER — SODIUM CHLORIDE, SODIUM LACTATE, POTASSIUM CHLORIDE, AND CALCIUM CHLORIDE .6; .31; .03; .02 G/100ML; G/100ML; G/100ML; G/100ML
250 INJECTION, SOLUTION INTRAVENOUS PRN
Status: DISCONTINUED | OUTPATIENT
Start: 2020-04-03 | End: 2020-04-07

## 2020-04-03 RX ORDER — DIPHENHYDRAMINE HYDROCHLORIDE 50 MG/ML
12.5 INJECTION INTRAMUSCULAR; INTRAVENOUS
Status: DISCONTINUED | OUTPATIENT
Start: 2020-04-03 | End: 2020-04-03 | Stop reason: HOSPADM

## 2020-04-03 RX ORDER — METOPROLOL TARTRATE 1 MG/ML
5 INJECTION, SOLUTION INTRAVENOUS
Status: DISCONTINUED | OUTPATIENT
Start: 2020-04-03 | End: 2020-04-08

## 2020-04-03 RX ORDER — DIPHENHYDRAMINE HYDROCHLORIDE 50 MG/ML
25 INJECTION INTRAMUSCULAR; INTRAVENOUS EVERY 6 HOURS PRN
Status: DISCONTINUED | OUTPATIENT
Start: 2020-04-03 | End: 2020-04-07

## 2020-04-03 RX ORDER — METOPROLOL TARTRATE 1 MG/ML
1 INJECTION, SOLUTION INTRAVENOUS
Status: DISCONTINUED | OUTPATIENT
Start: 2020-04-03 | End: 2020-04-03

## 2020-04-03 RX ORDER — HALOPERIDOL 5 MG/ML
1 INJECTION INTRAMUSCULAR
Status: DISCONTINUED | OUTPATIENT
Start: 2020-04-03 | End: 2020-04-03 | Stop reason: HOSPADM

## 2020-04-03 RX ORDER — DIPHENHYDRAMINE HYDROCHLORIDE 50 MG/ML
12.5 INJECTION INTRAMUSCULAR; INTRAVENOUS EVERY 6 HOURS PRN
Status: DISCONTINUED | OUTPATIENT
Start: 2020-04-03 | End: 2020-04-07

## 2020-04-03 RX ADMIN — HYDROMORPHONE HYDROCHLORIDE: 1 INJECTION, SOLUTION INTRAMUSCULAR; INTRAVENOUS; SUBCUTANEOUS at 11:49

## 2020-04-03 RX ADMIN — LIDOCAINE HYDROCHLORIDE,EPINEPHRINE BITARTRATE 3 ML: 15; .005 INJECTION, SOLUTION EPIDURAL; INFILTRATION; INTRACAUDAL; PERINEURAL at 07:44

## 2020-04-03 RX ADMIN — PIPERACILLIN SODIUM AND TAZOBACTAM SODIUM 3.38 G: 3; .375 INJECTION, POWDER, FOR SOLUTION INTRAVENOUS at 14:17

## 2020-04-03 RX ADMIN — PANTOPRAZOLE SODIUM 40 MG: 40 INJECTION, POWDER, LYOPHILIZED, FOR SOLUTION INTRAVENOUS at 14:17

## 2020-04-03 RX ADMIN — ALBUMIN (HUMAN) 250 ML: 2.5 SOLUTION INTRAVENOUS at 10:59

## 2020-04-03 RX ADMIN — FENTANYL CITRATE 100 MCG: 50 INJECTION, SOLUTION INTRAMUSCULAR; INTRAVENOUS at 08:15

## 2020-04-03 RX ADMIN — PHENYLEPHRINE HYDROCHLORIDE 100 MCG: 10 INJECTION INTRAVENOUS at 10:41

## 2020-04-03 RX ADMIN — ROCURONIUM BROMIDE 30 MG: 10 INJECTION, SOLUTION INTRAVENOUS at 08:02

## 2020-04-03 RX ADMIN — FENTANYL CITRATE 50 MCG: 50 INJECTION, SOLUTION INTRAMUSCULAR; INTRAVENOUS at 08:19

## 2020-04-03 RX ADMIN — FENTANYL CITRATE 50 MCG: 50 INJECTION, SOLUTION INTRAMUSCULAR; INTRAVENOUS at 07:30

## 2020-04-03 RX ADMIN — DEXAMETHASONE SODIUM PHOSPHATE 4 MG: 4 INJECTION, SOLUTION INTRA-ARTICULAR; INTRALESIONAL; INTRAMUSCULAR; INTRAVENOUS; SOFT TISSUE at 09:59

## 2020-04-03 RX ADMIN — CEFOTETAN DISODIUM 2 G: 2 INJECTION, POWDER, FOR SOLUTION INTRAMUSCULAR; INTRAVENOUS at 07:08

## 2020-04-03 RX ADMIN — SODIUM CHLORIDE 250 ML: 9 INJECTION, SOLUTION INTRAVENOUS at 20:44

## 2020-04-03 RX ADMIN — LIDOCAINE HYDROCHLORIDE 0.5 ML: 10 INJECTION, SOLUTION EPIDURAL; INFILTRATION; INTRACAUDAL; PERINEURAL at 06:26

## 2020-04-03 RX ADMIN — ROCURONIUM BROMIDE 20 MG: 10 INJECTION, SOLUTION INTRAVENOUS at 10:02

## 2020-04-03 RX ADMIN — PHENYLEPHRINE HYDROCHLORIDE 100 MCG: 10 INJECTION INTRAVENOUS at 11:05

## 2020-04-03 RX ADMIN — FENTANYL CITRATE 50 MCG: 50 INJECTION, SOLUTION INTRAMUSCULAR; INTRAVENOUS at 09:16

## 2020-04-03 RX ADMIN — KETOROLAC TROMETHAMINE 30 MG: 30 INJECTION, SOLUTION INTRAMUSCULAR at 11:14

## 2020-04-03 RX ADMIN — ROCURONIUM BROMIDE 30 MG: 10 INJECTION, SOLUTION INTRAVENOUS at 09:10

## 2020-04-03 RX ADMIN — PIPERACILLIN SODIUM AND TAZOBACTAM SODIUM 3.38 G: 3; .375 INJECTION, POWDER, FOR SOLUTION INTRAVENOUS at 17:42

## 2020-04-03 RX ADMIN — FENTANYL CITRATE 100 MCG: 50 INJECTION, SOLUTION INTRAMUSCULAR; INTRAVENOUS at 09:52

## 2020-04-03 RX ADMIN — MIDAZOLAM HYDROCHLORIDE 2 MG: 1 INJECTION, SOLUTION INTRAMUSCULAR; INTRAVENOUS at 07:09

## 2020-04-03 RX ADMIN — ALBUMIN (HUMAN) 12.5 G: 5 SOLUTION INTRAVENOUS at 20:32

## 2020-04-03 RX ADMIN — ONDANSETRON 4 MG: 2 INJECTION INTRAMUSCULAR; INTRAVENOUS at 09:59

## 2020-04-03 RX ADMIN — PROPOFOL 200 MG: 10 INJECTION, EMULSION INTRAVENOUS at 07:30

## 2020-04-03 RX ADMIN — ROCURONIUM BROMIDE 50 MG: 10 INJECTION, SOLUTION INTRAVENOUS at 07:33

## 2020-04-03 RX ADMIN — SUGAMMADEX 400 MG: 100 INJECTION, SOLUTION INTRAVENOUS at 11:14

## 2020-04-03 RX ADMIN — PHENYLEPHRINE HYDROCHLORIDE 100 MCG: 10 INJECTION INTRAVENOUS at 07:41

## 2020-04-03 RX ADMIN — ROCURONIUM BROMIDE 20 MG: 10 INJECTION, SOLUTION INTRAVENOUS at 11:01

## 2020-04-03 RX ADMIN — Medication 100 MG: at 07:31

## 2020-04-03 RX ADMIN — SODIUM CHLORIDE, POTASSIUM CHLORIDE, SODIUM LACTATE AND CALCIUM CHLORIDE: 600; 310; 30; 20 INJECTION, SOLUTION INTRAVENOUS at 07:08

## 2020-04-03 RX ADMIN — SODIUM CHLORIDE, POTASSIUM CHLORIDE, SODIUM LACTATE AND CALCIUM CHLORIDE: 600; 310; 30; 20 INJECTION, SOLUTION INTRAVENOUS at 06:26

## 2020-04-03 ASSESSMENT — ENCOUNTER SYMPTOMS
NAUSEA: 0
DIZZINESS: 0
SHORTNESS OF BREATH: 0
ABDOMINAL PAIN: 1
WHEEZING: 0
CHEST TIGHTNESS: 0
WEAKNESS: 0
VOMITING: 0

## 2020-04-03 ASSESSMENT — FIBROSIS 4 INDEX: FIB4 SCORE: 1.32

## 2020-04-03 ASSESSMENT — COPD QUESTIONNAIRES
COPD SCREENING SCORE: 2
DO YOU EVER COUGH UP ANY MUCUS OR PHLEGM?: NO/ONLY WITH OCCASIONAL COLDS OR INFECTIONS
HAVE YOU SMOKED AT LEAST 100 CIGARETTES IN YOUR ENTIRE LIFE: NO/DON'T KNOW
DURING THE PAST 4 WEEKS HOW MUCH DID YOU FEEL SHORT OF BREATH: NONE/LITTLE OF THE TIME

## 2020-04-03 ASSESSMENT — PAIN SCALES - GENERAL: PAIN_LEVEL: 1

## 2020-04-03 NOTE — ANESTHESIA QCDR
2019 [facility] Clinical Data Registry (for Quality Improvement)     Postoperative nausea/vomiting risk protocol (Adult = 18 yrs and Pediatric 3-17 yrs)- (430 and 463)  General inhalation anesthetic (NOT TIVA) with PONV risk factors: Yes  Provision of anti-emetic therapy with at least 2 different classes of agents: Yes   Patient DID NOT receive anti-emetic therapy and reason is documented in Medical Record:  N/A    Multimodal Pain Management- (477)  Non-emergent surgery AND patient age >= 18:   Use of Multimodal Pain Management, two or more drugs and/or interventions, NOT including systemic opioids:   Exception: Documented allergy to multiple classes of analgesics:     Smoking Abstinence (404)  Patient is current smoker (cigarette, pipe, e-cig, marijuanna):   Elective Surgery:   Abstinence instructions provided prior to day of surgery:   Patient abstained from smoking on day of surgery:     Pre-Op Beta-Blocker in Isolated CABG (44)  Isolated CABG AND patient age >= 18:   Beta-blocker admin within 24 hours of surgical incision:   Exception:of medical reason(s) for not administering beta blocker within 24 hours prior to surgical incision (e.g., not  indicated,other medical reason):     PACU assessment of acute postoperative pain prior to Anesthesia Care End- Applies to Patients Age = 18- (ABG7)  Initial PACU pain score is which of the following: < 7/10  Patient unable to report pain score: N/A    Post-anesthetic transfer of care checklist/protocol to PACU/ICU- (426 and 427)  Upon conclusion of case, patient transferred to which of the following locations: PACU/Non-ICU  Use of transfer checklist/protocol: Yes  Exclusion: Service Performed in Patient Hospital Room (and thus did not require transfer): N/A  Unplanned admission to ICU related to anesthesia service up through end of PACU care- (MD51)  Unplanned admission to ICU (not initially anticipated at anesthesia start time): No

## 2020-04-03 NOTE — ANESTHESIA TIME REPORT
Anesthesia Start and Stop Event Times     Date Time Event    4/3/2020 0648 Ready for Procedure     0708 Anesthesia Start     1136 Anesthesia Stop        Responsible Staff  04/03/20    Name Role Begin End    Sunitha Garza Anesth Tech 0708 [id number]    Abelino Baker M.D. Anesth 0708 [id number]        Preop Diagnosis (Free Text):  Pre-op Diagnosis     BILIARY, PANCREATIC CANCER        Preop Diagnosis (Codes):    Post op Diagnosis  Pancreatic cancer (HCC)      Premium Reason  Non-Premium    Comments:                                                                  Whipple procedure, epidural for post op analgesia

## 2020-04-03 NOTE — ANESTHESIA PROCEDURE NOTES
Epidural Block  Performed by: Abelino Baker M.D.  Authorized by: Abelino Baker M.D.     Patient Location:  OR  Reason for Block: at surgeon's request and post-op pain management    patient identified, IV checked, site marked, risks and benefits discussed, surgical consent, monitors and equipment checked, pre-op evaluation and timeout performed    Patient Position:  Sitting  Prep: ChloraPrep, patient draped and sterile technique    Monitoring:  Blood pressure, continuous pulse oximetry and heart rate  Location:  L1-L2  Injection Technique:  DANIEL air  Skin infiltration:  Lidocaine  Strength:  1%  Dose:  3ml  Needle Type:  Tuohy  Needle Gauge:  17 G  Needle Length:  3.5 in  Loss of resistance::  12  Catheter Size:  19 G  Catheter at Skin Depth:  15  Test Dose Result:  Negative

## 2020-04-03 NOTE — ANESTHESIA POSTPROCEDURE EVALUATION
Patient: Silvio Cooney    Procedure Summary     Date:  04/03/20 Room / Location:  [place] [address] / SURGERY [facility]    Anesthesia Start:  0708 Anesthesia Stop:  1136    Procedures:       WHIPPLE PROCEDURE- OMENTAL FLAP, INTRAOPERATIVE ULTRASOUND (Abdomen)      LYMPHADENECTOMY (Abdomen)      CHOLECYSTECTOMY (Abdomen) Diagnosis:  (BILIARY, PANCREATIC CANCER)    Surgeon:  Corwin Perez M.D. Responsible Provider:  Abelino Baker M.D.    Anesthesia Type:  general ASA Status:  3          Final Anesthesia Type: general  Last vitals  BP   Blood Pressure : 135/79    Temp   36.3 °C (97.3 °F)    Pulse   Pulse: 78   Resp   18    SpO2   95 %      Anesthesia Post Evaluation    Patient location during evaluation: PACU  Patient participation: complete - patient participated  Level of consciousness: awake and alert  Pain score: 1    Airway patency: patent  Anesthetic complications: no  Cardiovascular status: adequate and hemodynamically stable  Respiratory status: acceptable  Hydration status: acceptable    PONV: none           Nurse Pain Score: 0 (NPRS)

## 2020-04-03 NOTE — OR NURSING
Pt recovering well at this time. Denies pain or nausea. Moving in bed independently.  was called and updated with pt status and room assignment. 2 bags of belongings are on bed with pt in PACU. All incision sites are c/d/i, 2 DULCE MARIA drains and prevena working appropriately. Pt A&Ox4, VSS. Pt resting in bed in no distress.    Pt to be transported to ICU on monitor with 6L O2 via simple mask. Tank on bed is full.

## 2020-04-03 NOTE — ANESTHESIA PROCEDURE NOTES
Airway  Date/Time: 4/3/2020 7:34 AM  Performed by: Abelino Baker M.D.  Authorized by: Abelino Baker M.D.     Location:  OR  Urgency:  Elective  Indications for Airway Management:  Anesthesia      Spontaneous Ventilation: absent    Sedation Level:  Deep  Preoxygenated: Yes    Patient Position:  Sniffing  Final Airway Type:  Endotracheal airway  Final Endotracheal Airway:  ETT  Cuffed: Yes    Technique Used for Successful ETT Placement:  Direct laryngoscopy  Insertion Site:  Oral  Blade Type:  Singletary  Laryngoscope Blade/Videolaryngoscope Blade Size:  2  ETT Size (mm):  7.5  Measured from:  Teeth  ETT to Teeth (cm):  21  Placement Verified by: auscultation and capnometry    Cormack-Lehane Classification:  Grade I - full view of glottis  Number of Attempts at Approach:  1

## 2020-04-03 NOTE — ASSESSMENT & PLAN NOTE
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

## 2020-04-03 NOTE — PROGRESS NOTES
"Trauma / Surgical Daily Progress Note    Date of Service  4/3/2020    Chief Complaint  69 y.o. 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  Epidural in place for analgesia: No obvious hypotension and pain is reasonably controlled at this time.  Ramirez catheter in place with appropriate urine output.  Lovenox ordered to start tomorrow for DVT prophylaxis.  A.m. labs ordered.  Nasogastric tube in place.  Baseline hypertension: Not an issue at this point probably because of epidural but will order as needed medications.  2 drains with scant, bloody output    Review of Systems  Review of Systems   Respiratory: Negative for chest tightness, shortness of breath and wheezing.    Gastrointestinal: Positive for abdominal pain. Negative for nausea and vomiting.   Neurological: Negative for dizziness, syncope and weakness.        Vital Signs for last 24 hours  Temp:  [35.8 °C (96.4 °F)-36.9 °C (98.4 °F)] 35.8 °C (96.4 °F)  Pulse:  [71-78] 73  Resp:  [13-18] 14  BP: (109-146)/(62-79) 146/73  SpO2:  [95 %-100 %] 100 %    Hemodynamic parameters for last 24 hours    /73   Pulse 73   Temp (!) 35.8 °C (96.4 °F) (Bladder)   Resp 14   Ht 1.626 m (5' 4\")   Wt 67.4 kg (148 lb 9.4 oz)   SpO2 100%   BMI 25.51 kg/m²   Respiratory Data     Respiration: 14, Pulse Oximetry: 100 %             Physical Exam  Physical Exam  Vitals signs and nursing note reviewed.   Constitutional:       General: She is awake.   HENT:      Head: Normocephalic and atraumatic.      Mouth/Throat:      Mouth: Mucous membranes are moist.      Pharynx: Oropharynx is clear.   Eyes:      Extraocular Movements: Extraocular movements intact.      Conjunctiva/sclera: Conjunctivae normal.   Cardiovascular:      Rate and Rhythm: Normal rate and regular rhythm.  No extrasystoles are present.     Pulses: " Normal pulses.   Pulmonary:      Effort: No respiratory distress.      Breath sounds: Normal breath sounds. No stridor.   Abdominal:      General: There is no distension.      Palpations: Abdomen is soft.      Tenderness: There is no abdominal tenderness.      Comments: Eliza in plave  DULCE MARIA x2 w bloody fluid   Genitourinary:     Comments: fr[place]  Musculoskeletal: Normal range of motion.         General: No swelling.   Psychiatric:         Behavior: Behavior is cooperative.         Laboratory  Recent Results (from the past 24 hour(s))   COD (Adult)    Collection Time: 04/03/20  6:19 AM   Result Value Ref Range    ABO Grouping Only A     Rh Grouping Only NEG     Antibody Screen-Cod NEG    Histology Request    Collection Time: 04/03/20 12:22 PM   Result Value Ref Range    Pathology Request Sent to Histo        Fluids    Intake/Output Summary (Last 24 hours) at 4/3/2020 1426  Last data filed at 4/3/2020 1230  Gross per 24 hour   Intake 3104.1 ml   Output 475 ml   Net 2629.1 ml       Core Measures & Quality Metrics  Labs reviewed, Medications reviewed and Radiology images reviewed  [place] catheter: Critically Ill - Requiring Accurate Measurement of Urinary Output      DVT Prophylaxis: Enoxaparin (Lovenox)  DVT prophylaxis - mechanical: SCDs  Ulcer prophylaxis: Yes    Assessed for rehab: Patient unable to tolerate rehabilitation therapeutic regimen    VITALY Score  ETOH Screening    Assessment/Plan  Hypertension  Assessment & Plan  Premorbid  A.M. lisinopril/hydrochlorothiazide  P.M. 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

## 2020-04-03 NOTE — OP REPORT
DATE OF SERVICE:  04/03/2020    Patient is a 69-year-old female patient of Dr. Galicia, who is referred to me   after having been diagnosed with a large mucinous tumor in the head of the   pancreas, which did have an extremely large 2 cm polyp extending into what   appeared to be the common bile duct causing high-grade narrowing and concern   for future obstruction and malignancy.  The pathology did come back with   mucinous cells, but it was highly suspicious for malignancy.    SURGEON:  Corwin Perez MD    ASSISTANT SURGEON:  David Lan MD    ANESTHESIOLOGIST:  Abelino Baker MD    ANESTHESIA:  General and epidural for postoperative pain management.    ESTIMATED BLOOD LOSS:  200 mL    COMPLICATIONS:  No complications.    FINDINGS:  Patient had a large cystic mass in the head of the pancreas   extending above the cephalad portion of the pancreas into the common bile duct   and fused to the common bile duct.  Portal vein was free.  Some enlarged   lymph nodes in the martín as well as in the celiac trunk region.  Small nodule   in segment 5 was biopsied and came back as biliary hamartoma.  Frozen sections   of common hepatic duct near the hilum.  Biopsy came back with no sign of   malignancy.    PROCEDURES DONE:  1.  Standard Whipple procedure with partial gastrectomy and BII gastrojejunostomy.  2.  Lymph node dissection of the celiac and portal regions.  3.  Omental flap to the pancreaticojejunostomy.  4.  Intraoperative ultrasound survey of the pancreas, liver, bile ducts.  5.  Liver wedge resection segment 5.    PREOPERATIVE DIAGNOSIS:  Pancreatic cyst with what appeared suspicious for   malignancy.    POSTOPERATIVE DIAGNOSIS:  Pancreatic cyst with what appeared suspicious for   malignancy.    CASE CLASS:  This is a contaminated case.    DESCRIPTION OF PROCEDURE:  Patient was brought to OR, placed under general   anesthetic after having an epidural placed by Dr. Baker for postoperative pain    management with both arms out, Ramirez catheter placed, shaved, prepped with   chlorhexidine prep, covered with sterile drapes, given preoperative   antibiotics.  Timeout was done, which was adequate.  At that point, an   incision was made from the xiphoid down to just below the umbilicus with a 10   blade and Bovie electrocautery.  Upon entering the abdomen, the falciform   ligament was mobilized and brought down and stored up by the left lateral   segment of the liver.  We then proceeded and immediately noted that the   patient had a large cystic mass in the head of the pancreas extending cephalad   overlying the common bile duct.  Patient also was found to have some   significant inflammatory changes and fibrosis of the gallbladder.  We did   intraoperative survey of the liver, right lobe and left lobe, identified no   obvious tumors, several small cysts, but there was a small nodule on the   surface of segment 5.  At that point, once we used a 10 blade and excised   portion of segment 5 in a wedge fashion and sent off to pathology, we then   proceeded with continuing our ultrasound.  Fortunately, during our survey of   the pancreatic head whereby we identified a large cystic mass with a large   tumor within it, which appeared to be extending and invading the biliary   system, which was highly suspicious for malignancy.  We then identified that   it did appear that the portal vein was free and SMV.  There was no sign of   metastatic disease after running the small bowel, mesentery, peritoneum   throughout.  At that point, once that was completed, we then proceeded with   kocherizing the duodenum, followed by making a Cattell-Braasch, which allowed   us to mobilize the liver, however, the small bowel, out of the abdomen.  We   then proceeded with mobilizing all through the small bowel mesentery up to the   ligament of Treitz.  We mobilized the ligament of Treitz and the duodenum and   at that level, eviscerated the  small bowel and the right colon, then   continued our dissection retroperitoneal.  There were no retroperitoneal nodes   that were obvious or signs of metastatic disease.  We continued our   mobilization, exposed the anterior surface of the vena cava and left renal   vein and the aorta to be taken en bloc, soft tissue be taken en bloc with the   specimen.  At that point, we then went cephalad, took down the gallbladder in   dome down technique all the way down the cystic duct margin.  We used 2 large   clips proximal and 1 distal on the cystic duct and cystic artery.  We did send   off the cystic duct for margins and requested that the pathologist open the   specimen.  It did appear that she had multiple polyps throughout her   gallbladder extending down towards the cystic duct, but according to the   pathologist, frozen section did not appear to have any sign of malignancy.  At   that point, attention was paid above.  We went down below the transverse   mesocolon and there was no sign of puckering there.  We then opened the lesser   sac using the Thunderbeat, salvaging the epiploic vessels with the stomach   and mobilizing the soft tissue posterior attachment of the pancreas with Bovie   electrocautery.  We immediately identified what appeared to be a fairly   normal pancreas with a normal pancreatic duct.  We incised the inferior   margin, continued our dissection laterally.  We then encountered the epiploic   vein and middle colic vein.  We transected these with 2-0 silk suture   ligatures, identified the SMV inferiorly along the groove after taking down   the hepatic flexure of the colon and this appeared completely free at the   uncinate level.  We worked our way cephalad.  There were several small   branches that were ligated with 3-0 silk sutures and clips, and noted that it   appeared that posterior to the neck of the pancreas that there was one large   branch, but it appeared completely free of the tumor.   At that point, once we   had gotten above the top portion of the pancreas, we then proceeded with   identifying the common hepatic artery.  We dissected some enlarged nodes   around the celiac trunk and sent those off separately.  We then continued our   dissection along the celiac trunk until we encountered right gastric artery   and ligated that with 2-0 silk sutures and clips, followed by the   gastroduodenal artery with 2-0 sutures and clips exposing the anterior surface   of the portal vein.  Working our way inferiorly, it did appear that the   portal vein was free of this tumor.  There was a large mass and a cystic   lesion in the head of the pancreas, which did extend into the martín.  We were   able to go cephalad all the way up to the hilum.  Posteriorly, the portal vein   was free.  We did get control of the hilum.  It was extremely soft and on   ultrasound appeared completely normal.  We got control of the hilum exposing   both the right and left ducts with a vessel loop and elevated that.  At that   point, we felt comfortable that we could proceed with a Whipple procedure.  We   tied off the distal common hepatic duct with a 2-0 silk suture, transected   the hilum with a blade, sent a frozen section of the hilum and this came back   with no sign of malignancy.  There were some inflammatory changes though.  In   any event, at that point, we did note that we had a dominant right duct and   then the left side was separate and involved 2 branches on the left, so there   were total of 3 trifurcation at the hilum with extremely small branch, left   lateral segment.  At that point, now that we transected the portion of that,   we then used a 3-0 Prolene with an SH needle and did a figure-of-eight suture   x4 ligating the superior and inferior pancreaticoduodenal arteries.  We   transected the pancreas with a Bovie electrocautery.  There was one large   collateral vessel in the area of the neck and we ligated  that with clips   attaching directly to portal vein from the pancreatic head.  At that point, we   then took our probe and identified what appeared to be a 1-2 mm pancreatic   duct.  We were able to place a 3-Hebrew pancreatic duct stent into that to   yasmine it for our anastomosis.  At that point, we mobilized immediately the   lateral segment of the portal vein easily with blunt dissection, with care to   avoid injury to the mass.  We then went distal to ligament of Treitz,   transected the small bowel with an Endo NATALIYA 60 blue load and then using the   Thunderbeat, we ligated the mesentery to the small bowel and continued around   the ligament of Treitz all the way to the uncinate process.  We then pulled   the bowel underneath the vessels to the right side and then started ligating   the mesouncinate with the Thunderbeat without any difficulty.  We used several   clips for some what appeared to be small collateral vessels working our way   up.  Once completed and , the margins were marked.  Specimen was sent   off to pathology.  We then proceeded with irrigating with approximately 2   liters of hot water due to the fact that we had bile spillage.  We then   brought our proximal small bowel underneath the vessels and in a C-loop   started our pancreaticojejunostomy anastomosis using a 3-0 Prolene,   straightening the needle and doing a full-thickness through-and-through bite   approximately a centimeter from our transection margin, starting cephalad and   working our way inferiorly.  We then grabbed the posterior wall of the small   bowel and this was done 3 times, care to avoid narrowing the pancreatic duct.    Once we had stay sutures of all 3 Prolenes, we then proceeded with opening   our small bowel.  We then did an end-to-side ductal mucosa anastomosis   pancreaticojejunostomy using 5-0 PDS and RB needle.  Unfortunately, with the   stent in place, we were able to see the extremely small pancreatic duct  and   using 6 separate sutures to reapproximate our pancreatic duct to mucosa   anastomosis.  We did dunk the pancreatic stent into the small bowel to help   stent that open and minimize risk of leak.  Once that was completed, we then   used our Prolenes with the needle and did our anterior row   pancreaticojejunostomy and secured those in place.  At that point, attention   was paid to the bile duct.  We did run our small bowel for approximately 20 cm   from the pancreaticojejunostomy to where our hepatic duct would be.  We used   interrupted 4-0 PDS on RB and SH needles elevating the hilum at the 3 o'clock   position anteriorly all the way to the 9 o'clock position on the extremely   small branch.  Once we elevated our anterior row, we then made a small   enterotomy in our jejunum and started doing our posterior rows, but had to   extend that incision twice, a total of centimeter and half, giving us a total   anastomosis of 2.5 cm because we were sewing directly to 3 separate ducts to   the enterotomy using the hilum of the martín.  Once completed, we tied our   anterior rows and then we were completed with our anastomosis.  We irrigated   with another liter of hot water, placed a dry lap around it to see if there   was any bile leak.  Attention was then paid to the gastric anastomosis.  As of   note, we did transect the duodenum with an Endo NATALIYA 60 blue load and then   upon reinspection, it appeared there was some ischemia at the duodenum near   the pylorus.  At that point, we elected to go ahead and do a standard Whipple.    We ligated the epiploic vessels with 2-0 silk sutures at the greater curve   and the lesser curve, transected the stomach with 2 firings of Endo NATALIYA 60   gold loads.  We then placed a NG tube, placed in the stomach.  We then brought   our jejunum up approximately 50 cm from our hepaticojejunostomy and did a   side-to-side gastrojejunostomy in a loop fashion, Billroth II.  We secured it   with  2-0 silk sutures posteriorly near the greater curve, made enterotomy and   a gastrotomy at the midpoint of both of our anastomoses and fired an Endo NATALIYA   60 blue load, proximal and distal, to almost a 10 cm opening.  At that point,   we then closed our enterotomy and gastrotomy with a TA 60.  We used several   small 3-0 silk sutures to reinforce our anastomosis, but minimize any kinking.    We did bring our NG tube down through our anastomosis into the efferent limb   in order to help avoid any kinking because it did appear to twist on itself   slightly, which was easily reduced once the NG tube was positioned in place.    At that point, we then irrigated with copious amounts of fluid.  We went back   to her biliary system, showed no bile leak.  We used Bovie electrocautery   along the cystic plate for any oozing from our cholecystectomy.  We then   mobilized the omentum off the transverse mesocolon, took the right side and   placed it on her pancreaticojejunostomy.  After having placed two 19-English   round Edouard drains, one posteriorly and one anteriorly, and the posterior one   did tract posterior to her hepaticojejunostomy and posterior   pancreaticojejunostomy.  These were secured with 2-0 nylons.  The NG tube was   secured.  Omental flap was secured with 3-0 silk sutures.  So, our operation   was completed.  We reinspected the liver wedge resection.  There was no sign   of bile leak or bleeding.  Final sweep of the abdomen was negative.  We ran   the small bowel distally to the cecum and there was no sign of twisting or   ischemia.  So at that point, the operation was completed.  We closed the   abdomen with looped PDS starting in both directions tying in the center,   irrigated the skin with copious amounts of hot water, closed it with staples   and then placed a Prevena, extubated the patient, and transferred to the   recovery and then onto the [facility] ICU.       ____________________________________      MD YAYA Sena / RHONDA    DD:  04/03/2020 12:02:22  DT:  04/03/2020 13:05:24    D#:  [id number]  Job#:  [id number]    cc: David Lan MD, Lyle Galicia MD

## 2020-04-04 ENCOUNTER — ANESTHESIA (OUTPATIENT)
Dept: ANESTHESIOLOGY | Facility: MEDICAL CENTER | Age: 70
End: 2020-04-04

## 2020-04-04 ENCOUNTER — ANESTHESIA EVENT (OUTPATIENT)
Dept: ANESTHESIOLOGY | Facility: MEDICAL CENTER | Age: 70
End: 2020-04-04

## 2020-04-04 PROBLEM — D62 ACUTE BLOOD LOSS ANEMIA: Status: ACTIVE | Noted: 2020-04-04

## 2020-04-04 LAB
ALBUMIN SERPL BCP-MCNC: 3.3 G/DL (ref 3.2–4.9)
ALBUMIN/GLOB SERPL: 1.7 G/DL
ALP SERPL-CCNC: 34 U/L (ref 30–99)
ALT SERPL-CCNC: 78 U/L (ref 2–50)
ANION GAP SERPL CALC-SCNC: 11 MMOL/L (ref 7–16)
AST SERPL-CCNC: 97 U/L (ref 12–45)
BILIRUB SERPL-MCNC: 0.7 MG/DL (ref 0.1–1.5)
BUN SERPL-MCNC: 11 MG/DL (ref 8–22)
CALCIUM SERPL-MCNC: 7.7 MG/DL (ref 8.5–10.5)
CHLORIDE SERPL-SCNC: 108 MMOL/L (ref 96–112)
CO2 SERPL-SCNC: 23 MMOL/L (ref 20–33)
CREAT SERPL-MCNC: 0.66 MG/DL (ref 0.5–1.4)
ERYTHROCYTE [DISTWIDTH] IN BLOOD BY AUTOMATED COUNT: 46.4 FL (ref 35.9–50)
GLOBULIN SER CALC-MCNC: 1.9 G/DL (ref 1.9–3.5)
GLUCOSE SERPL-MCNC: 117 MG/DL (ref 65–99)
HCT VFR BLD AUTO: 31.4 % (ref 37–47)
HGB BLD-MCNC: 10.5 G/DL (ref 12–16)
MCH RBC QN AUTO: 31.3 PG (ref 27–33)
MCHC RBC AUTO-ENTMCNC: 33.4 G/DL (ref 33.6–35)
MCV RBC AUTO: 93.5 FL (ref 81.4–97.8)
PLATELET # BLD AUTO: 226 K/UL (ref 164–446)
PMV BLD AUTO: 9.9 FL (ref 9–12.9)
POTASSIUM SERPL-SCNC: 3.8 MMOL/L (ref 3.6–5.5)
PROT SERPL-MCNC: 5.2 G/DL (ref 6–8.2)
RBC # BLD AUTO: 3.36 M/UL (ref 4.2–5.4)
SODIUM SERPL-SCNC: 142 MMOL/L (ref 135–145)
WBC # BLD AUTO: 8 K/UL (ref 4.8–10.8)

## 2020-04-04 PROCEDURE — 700105 HCHG RX REV CODE 258: Performed by: SURGERY

## 2020-04-04 PROCEDURE — 85027 COMPLETE CBC AUTOMATED: CPT

## 2020-04-04 PROCEDURE — 700102 HCHG RX REV CODE 250 W/ 637 OVERRIDE(OP): Performed by: ANESTHESIOLOGY

## 2020-04-04 PROCEDURE — P9047 ALBUMIN (HUMAN), 25%, 50ML: HCPCS | Performed by: SURGERY

## 2020-04-04 PROCEDURE — 97166 OT EVAL MOD COMPLEX 45 MIN: CPT

## 2020-04-04 PROCEDURE — C9113 INJ PANTOPRAZOLE SODIUM, VIA: HCPCS | Performed by: SURGERY

## 2020-04-04 PROCEDURE — 97162 PT EVAL MOD COMPLEX 30 MIN: CPT

## 2020-04-04 PROCEDURE — 700111 HCHG RX REV CODE 636 W/ 250 OVERRIDE (IP): Performed by: SURGERY

## 2020-04-04 PROCEDURE — A9270 NON-COVERED ITEM OR SERVICE: HCPCS | Performed by: ANESTHESIOLOGY

## 2020-04-04 PROCEDURE — 80053 COMPREHEN METABOLIC PANEL: CPT

## 2020-04-04 PROCEDURE — 700111 HCHG RX REV CODE 636 W/ 250 OVERRIDE (IP): Performed by: ANESTHESIOLOGY

## 2020-04-04 PROCEDURE — 99233 SBSQ HOSP IP/OBS HIGH 50: CPT | Performed by: SURGERY

## 2020-04-04 PROCEDURE — 770022 HCHG ROOM/CARE - ICU (200)

## 2020-04-04 RX ORDER — ALBUMIN (HUMAN) 12.5 G/50ML
12.5 SOLUTION INTRAVENOUS ONCE
Status: COMPLETED | OUTPATIENT
Start: 2020-04-04 | End: 2020-04-04

## 2020-04-04 RX ORDER — SODIUM CHLORIDE 9 MG/ML
250 INJECTION, SOLUTION INTRAVENOUS ONCE
Status: COMPLETED | OUTPATIENT
Start: 2020-04-04 | End: 2020-04-04

## 2020-04-04 RX ADMIN — SODIUM CHLORIDE, POTASSIUM CHLORIDE, SODIUM LACTATE AND CALCIUM CHLORIDE: 600; 310; 30; 20 INJECTION, SOLUTION INTRAVENOUS at 15:03

## 2020-04-04 RX ADMIN — PIPERACILLIN SODIUM AND TAZOBACTAM SODIUM 3.38 G: 3; .375 INJECTION, POWDER, FOR SOLUTION INTRAVENOUS at 05:10

## 2020-04-04 RX ADMIN — ONDANSETRON 4 MG: 2 INJECTION INTRAMUSCULAR; INTRAVENOUS at 09:59

## 2020-04-04 RX ADMIN — ALBUMIN (HUMAN) 12.5 G: 5 SOLUTION INTRAVENOUS at 04:04

## 2020-04-04 RX ADMIN — PANTOPRAZOLE SODIUM 40 MG: 40 INJECTION, POWDER, LYOPHILIZED, FOR SOLUTION INTRAVENOUS at 06:31

## 2020-04-04 RX ADMIN — SODIUM CHLORIDE 250 ML: 9 INJECTION, SOLUTION INTRAVENOUS at 04:25

## 2020-04-04 RX ADMIN — ONDANSETRON 4 MG: 2 INJECTION INTRAMUSCULAR; INTRAVENOUS at 04:07

## 2020-04-04 RX ADMIN — ENOXAPARIN SODIUM 40 MG: 100 INJECTION SUBCUTANEOUS at 09:37

## 2020-04-04 ASSESSMENT — COGNITIVE AND FUNCTIONAL STATUS - GENERAL
MOVING TO AND FROM BED TO CHAIR: A LITTLE
TURNING FROM BACK TO SIDE WHILE IN FLAT BAD: A LITTLE
DRESSING REGULAR UPPER BODY CLOTHING: A LITTLE
MOBILITY SCORE: 18
SUGGESTED CMS G CODE MODIFIER DAILY ACTIVITY: CJ
STANDING UP FROM CHAIR USING ARMS: A LITTLE
WALKING IN HOSPITAL ROOM: A LITTLE
SUGGESTED CMS G CODE MODIFIER MOBILITY: CK
CLIMB 3 TO 5 STEPS WITH RAILING: A LITTLE
MOVING FROM LYING ON BACK TO SITTING ON SIDE OF FLAT BED: A LITTLE
DRESSING REGULAR LOWER BODY CLOTHING: A LITTLE
DAILY ACTIVITIY SCORE: 20
TOILETING: A LITTLE
HELP NEEDED FOR BATHING: A LITTLE

## 2020-04-04 ASSESSMENT — ENCOUNTER SYMPTOMS: SORE THROAT: 1

## 2020-04-04 ASSESSMENT — GAIT ASSESSMENTS
DISTANCE (FEET): 55
DEVIATION: BRADYKINETIC
ASSISTIVE DEVICE: FRONT WHEEL WALKER
GAIT LEVEL OF ASSIST: MINIMAL ASSIST

## 2020-04-04 ASSESSMENT — ACTIVITIES OF DAILY LIVING (ADL): TOILETING: INDEPENDENT

## 2020-04-04 NOTE — CARE PLAN
Problem: Safety  Goal: Will remain free from injury  Outcome: PROGRESSING AS EXPECTED  Note: Patient oriented to surroundings, bed in low and locked position, call light and belongings in reach     Problem: Pain Management  Goal: Pain level will decrease to patient's comfort goal  Outcome: PROGRESSING AS EXPECTED  Note: Pain assessed regularly, epidural verified, patient repositioned for pain alleviation, medicated per MAR

## 2020-04-04 NOTE — THERAPY
"Occupational Therapy Evaluation completed.   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 Pt performed LB dressing with min a, UB ADLs supervision, STS from chair with sup/sba, ambulated short distance in hallway with cga/min using FWW, c/o lightheadedness with no exacerbation with activity, vitals monitored and stable. Pt will benefit from few more ADL sessions to review/train for LB ADL modifications, AE training and energy conservation techniques as needed. Anticipate with increased oob ADLs and mobility pt will be able to d/c home with assist from spouse.   Plan of Care: Will benefit from Occupational Therapy 4 times per week  Discharge Recommendations:  Equipment: Will Continue to Assess for Equipment Needs. Post-acute therapy Recommend home health for continued occupational therapy services.       See \"Rehab Therapy-Acute\" Patient Summary Report for complete documentation.    "

## 2020-04-04 NOTE — CARE PLAN
Problem: Safety  Goal: Will remain free from injury  Outcome: PROGRESSING AS EXPECTED  Note: Patient oriented to surroundings, bed alarm in use, bed in low and locked position, call light within reach, patient in room near nurses station. Patient educated to call prior to mobilizing     Problem: Pain Management  Goal: Pain level will decrease to patient's comfort goal  Outcome: PROGRESSING AS EXPECTED  Note: Patient's pain assessed regularly, epidural verified and titrated per MD order, patient repositioned for pain alleviation

## 2020-04-04 NOTE — ANESTHESIA POST-OP FOLLOW-UP NOTE
"Anesthesia Pain Service Note    Type:  Epidural catheter    Patient: Silvio Cooney    Patient seen and examined. and Patient chart reviewed.     BP (!) 98/54   Pulse 74   Temp (!) 35.8 °C (96.4 °F) (Bladder)   Resp 18   Ht 1.626 m (5' 4\")   Wt 67.4 kg (148 lb 9.4 oz)   SpO2 97%   BMI 25.51 kg/m²     Complaints:  No complaints.    Pain:   0/10    LOC:   Awake    Rate:  6    Exam:  Vital signs stable, Afebrile and Site clean, dry, intact without tenderness or erythema    Impression:  Adequate analgesia    Assessment & Plan:  Acute post-procedural pain (G89.18)     No change  - continue      Yusuf Kiran M.D.  4/4/2020  7:26 AM  "

## 2020-04-04 NOTE — PROGRESS NOTES
Patient to S123 from PACU. Report received from Nirmala PACU RN. Epidural verified.     2 RN skin check complete with Jelani RN.  Devices in place SCDs, BP cuff, pulse ox, NG, oxymask, Epidural, midline prevena, RLQ DULCE MARIA drains x2.     Skin assessed under the above mentioned devices.     Preventative measures in place including q2h turns, pillows and draw sheet used for repositioning.    Following areas of concern:   · No skin breakdown noted, all bony prominences intact and blanching.   - Midline prevena and RLQ DULCE MARIA drains x2        Wound consult placedYES/NO: no    Wound reported YES/NO: no  Appropriate LDAs opened YES/NO: yes

## 2020-04-04 NOTE — PROGRESS NOTES
Surgical Progress Note    Author: Corwin Perez M.D. Date & Time created: 2020   9:31 AM     Interval Events:  POD 1 s/p whipple  Labs ok  Ambulate  Dc ngt    Review of Systems   HENT: Positive for sore throat.    All other systems reviewed and are negative.    Hemodynamics:  Temp (24hrs), Av.3 °C (97.4 °F), Min:35.8 °C (96.4 °F), Max:36.9 °C (98.4 °F)  Temperature: (!) 35.8 °C (96.4 °F), Monitored Temp: 37.7 °C (99.9 °F)  Pulse  Av.4  Min: 65  Max: 92   Blood Pressure : 108/53     Respiratory:    Respiration: (!) 41, Pulse Oximetry: 91 %     Work Of Breathing / Effort: Mild  RUL Breath Sounds: Clear, RML Breath Sounds: Clear, RLL Breath Sounds: Diminished, CEASAR Breath Sounds: Clear, LLL Breath Sounds: Diminished  Neuro:  GCS Total Kristi Coma Score: 15     Fluids:    Intake/Output Summary (Last 24 hours) at 2020 0931  Last data filed at 2020 0800  Gross per 24 hour   Intake 6271.1 ml   Output 1285 ml   Net 4986.1 ml        Current Diet Order   Procedures   • Diet NPO     Physical Exam  Vitals signs and nursing note reviewed.   Cardiovascular:      Rate and Rhythm: Normal rate and regular rhythm.   Pulmonary:      Effort: Pulmonary effort is normal.      Breath sounds: Normal breath sounds.   Abdominal:      General: Abdomen is flat.      Palpations: Abdomen is soft.      Tenderness: There is abdominal tenderness.      Comments: essentially no pain  DULCE MARIA serous sanguinous       Labs:  Recent Results (from the past 24 hour(s))   Histology Request    Collection Time: 20 12:22 PM   Result Value Ref Range    Pathology Request Sent to [facility]    CBC without Differential    Collection Time: 20  5:00 AM   Result Value Ref Range    WBC 8.0 4.8 - 10.8 K/uL    RBC 3.36 (L) 4.20 - 5.40 M/uL    Hemoglobin 10.5 (L) 12.0 - 16.0 g/dL    Hematocrit 31.4 (L) 37.0 - 47.0 %    MCV 93.5 81.4 - 97.8 fL    MCH 31.3 27.0 - 33.0 pg    MCHC 33.4 (L) 33.6 - 35.0 g/dL    RDW 46.4 35.9 - 50.0 fL     Platelet Count 226 164 - 446 K/uL    MPV 9.9 9.0 - 12.9 fL   Comp Metabolic Panel (CMP)    Collection Time: 04/04/20  5:00 AM   Result Value Ref Range    Sodium 142 135 - 145 mmol/L    Potassium 3.8 3.6 - 5.5 mmol/L    Chloride 108 96 - 112 mmol/L    Co2 23 20 - 33 mmol/L    Anion Gap 11.0 7.0 - 16.0    Glucose 117 (H) 65 - 99 mg/dL    Bun 11 8 - 22 mg/dL    Creatinine 0.66 0.50 - 1.40 mg/dL    Calcium 7.7 (L) 8.5 - 10.5 mg/dL    AST(SGOT) 97 (H) 12 - 45 U/L    ALT(SGPT) 78 (H) 2 - 50 U/L    Alkaline Phosphatase 34 30 - 99 U/L    Total Bilirubin 0.7 0.1 - 1.5 mg/dL    Albumin 3.3 3.2 - 4.9 g/dL    Total Protein 5.2 (L) 6.0 - 8.2 g/dL    Globulin 1.9 1.9 - 3.5 g/dL    A-G Ratio 1.7 g/dL   ESTIMATED GFR    Collection Time: 04/04/20  5:00 AM   Result Value Ref Range    GFR If African American >60 >60 mL/min/1.73 m 2    GFR If Non African American >60 >60 mL/min/1.73 m 2     Medical Decision Making, by Problem:  Active Hospital Problems    Diagnosis   • 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 lisinopril/hydrochlorothiazide  P.M. lisinopril  PRN medication ordered     • Heart valve disease [I38]     Priority: Medium     mitral valve insufficiency  On Lopressor  Restart with PO once NGT out       • High cholesterol [E78.00]     Priority: Medium     Premorbid on statin  Restart when taking PO       Plan:  DC NGT  Still NPO except ice chips  Manage fluids  Will stay in ICU due to fluid issue  Decrease epidural to 4 but will stay in until Monday or tuesday    Quality Measures:  Quality-Core Measures    Discussed patient condition with Family and RN

## 2020-04-04 NOTE — PROGRESS NOTES
Trauma / Surgical Daily Progress Note    Date of Service  4/4/2020    Chief Complaint  69 y.o. female admitted 4/3/2020 with BILIARY, PANCREATIC CANCER    Interval Events    POD 1 Whipple  's  Multiple fluid bolus and colloid bolus to support oliguria  RUQ drains.    Midline wound Prevena   Antibiotics:  Finishing zosyn  Tmax 100.6   NG out: sips.     Lovenox yes.    Epidural in.    ICU today: fluids bolus , oliguria      Review of Systems  Review of Systems     Vital Signs for last 24 hours  Pulse:  [73-96] 83  Resp:  [12-41] 18  BP: ()/(48-62) 117/56  SpO2:  [91 %-100 %] 99 %    Hemodynamic parameters for last 24 hours       Respiratory Data     Respiration: 18, Pulse Oximetry: 99 %        RUL Breath Sounds: Clear, RML Breath Sounds: Clear, RLL Breath Sounds: Diminished, CEASAR Breath Sounds: Clear, LLL Breath Sounds: Diminished    Physical Exam  Physical Exam  HENT:      Head: Normocephalic.   Eyes:      Pupils: Pupils are equal, round, and reactive to light.   Cardiovascular:      Rate and Rhythm: Normal rate and regular rhythm.   Pulmonary:      Effort: No respiratory distress.      Breath sounds: No wheezing.   Abdominal:      Palpations: Abdomen is soft.      Tenderness: There is abdominal tenderness.   Skin:     Coloration: Skin is not jaundiced.   Neurological:      General: No focal deficit present.      Mental Status: She is alert.   Psychiatric:         Mood and Affect: Mood normal.         Laboratory  Recent Results (from the past 24 hour(s))   CBC without Differential    Collection Time: 04/04/20  5:00 AM   Result Value Ref Range    WBC 8.0 4.8 - 10.8 K/uL    RBC 3.36 (L) 4.20 - 5.40 M/uL    Hemoglobin 10.5 (L) 12.0 - 16.0 g/dL    Hematocrit 31.4 (L) 37.0 - 47.0 %    MCV 93.5 81.4 - 97.8 fL    MCH 31.3 27.0 - 33.0 pg    MCHC 33.4 (L) 33.6 - 35.0 g/dL    RDW 46.4 35.9 - 50.0 fL    Platelet Count 226 164 - 446 K/uL    MPV 9.9 9.0 - 12.9 fL   Comp Metabolic Panel (CMP)    Collection Time:  04/04/20  5:00 AM   Result Value Ref Range    Sodium 142 135 - 145 mmol/L    Potassium 3.8 3.6 - 5.5 mmol/L    Chloride 108 96 - 112 mmol/L    Co2 23 20 - 33 mmol/L    Anion Gap 11.0 7.0 - 16.0    Glucose 117 (H) 65 - 99 mg/dL    Bun 11 8 - 22 mg/dL    Creatinine 0.66 0.50 - 1.40 mg/dL    Calcium 7.7 (L) 8.5 - 10.5 mg/dL    AST(SGOT) 97 (H) 12 - 45 U/L    ALT(SGPT) 78 (H) 2 - 50 U/L    Alkaline Phosphatase 34 30 - 99 U/L    Total Bilirubin 0.7 0.1 - 1.5 mg/dL    Albumin 3.3 3.2 - 4.9 g/dL    Total Protein 5.2 (L) 6.0 - 8.2 g/dL    Globulin 1.9 1.9 - 3.5 g/dL    A-G Ratio 1.7 g/dL   ESTIMATED GFR    Collection Time: 04/04/20  5:00 AM   Result Value Ref Range    GFR If African American >60 >60 mL/min/1.73 m 2    GFR If Non African American >60 >60 mL/min/1.73 m 2       Fluids    Intake/Output Summary (Last 24 hours) at 4/4/2020 1742  Last data filed at 4/4/2020 1600  Gross per 24 hour   Intake 3481 ml   Output 955 ml   Net 2526 ml       Core Measures & Quality Metrics  Labs reviewed, Medications reviewed and Radiology images reviewed  Ramirez catheter: Critically Ill - Requiring Accurate Measurement of Urinary Output      DVT Prophylaxis: Enoxaparin (Lovenox)  DVT prophylaxis - mechanical: SCDs          VITALY Score  ETOH Screening    Assessment/Plan  Hypertension- (present on admission)  Assessment & Plan  Premorbid  A.M. lisinopril/hydrochlorothiazide  P.M. 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      Discussed patient condition with RN, RT and Pharmacy.  CRITICAL CARE TIME EXCLUDING PROCEDURES: 35  Minutes.Decision making of high complexity.  I reviewed clinical labs, trends and orders for follow up.  Review of imaging,reports, consultant documentation .  Utilization of the information in todays decision making.   I evaluated the patient condition at bedside and discussed the daily plan(s) with available nursing staff,  pharmacists on rounds. Managing acute blood loss anemia, oliguria which required multiple fluid and colloid bolus, IV antibiotics.

## 2020-04-04 NOTE — THERAPY
"Physical Therapy Evaluation completed.   Bed Mobility:  Up in chair   Transfers:  Supervision  Gait:  Minimum assist with Front-Wheel Walker       Plan of Care: Will benefit from Physical Therapy 5 times per week  Discharge Recommendations: Equipment: Will Continue to Assess for Equipment Needs. Post-acute therapy Recommend home health transitional care for continued physical therapy services.     Ms. Cooney is a 70 y/o female who presents to acute secondary to tpancreatic cyst with malignancy s/p Whipple with gasterectomy, gasterojujenostomy, and liver wedge resection. She presents with mild dynamic balance deficits when standing that most impact her gait. States she feels an underlying lightheadedness (surgeon present and reported potentially from epidural as vitals WFL). Mild dynamic instability when ambulating, min assist provided for safety. Pt very strong and motivated to participate. Anticipate her deficits will be managed by home health therapies. Acute PT to continue to follow while in house to improve gait and determine if FWW needed upon discharge.     See \"Rehab Therapy-Acute\" Patient Summary Report for complete documentation.     "

## 2020-04-05 ENCOUNTER — ANESTHESIA (OUTPATIENT)
Dept: ANESTHESIOLOGY | Facility: MEDICAL CENTER | Age: 70
End: 2020-04-05

## 2020-04-05 ENCOUNTER — ANESTHESIA EVENT (OUTPATIENT)
Dept: ANESTHESIOLOGY | Facility: MEDICAL CENTER | Age: 70
End: 2020-04-05

## 2020-04-05 LAB
ALBUMIN SERPL BCP-MCNC: 3 G/DL (ref 3.2–4.9)
ALBUMIN/GLOB SERPL: 1.3 G/DL
ALP SERPL-CCNC: 44 U/L (ref 30–99)
ALT SERPL-CCNC: 132 U/L (ref 2–50)
ANION GAP SERPL CALC-SCNC: 12 MMOL/L (ref 7–16)
AST SERPL-CCNC: 127 U/L (ref 12–45)
BILIRUB SERPL-MCNC: 0.7 MG/DL (ref 0.1–1.5)
BUN SERPL-MCNC: 8 MG/DL (ref 8–22)
CALCIUM SERPL-MCNC: 8.2 MG/DL (ref 8.5–10.5)
CHLORIDE SERPL-SCNC: 107 MMOL/L (ref 96–112)
CO2 SERPL-SCNC: 22 MMOL/L (ref 20–33)
CREAT SERPL-MCNC: 0.43 MG/DL (ref 0.5–1.4)
ERYTHROCYTE [DISTWIDTH] IN BLOOD BY AUTOMATED COUNT: 48 FL (ref 35.9–50)
GLOBULIN SER CALC-MCNC: 2.4 G/DL (ref 1.9–3.5)
GLUCOSE SERPL-MCNC: 88 MG/DL (ref 65–99)
HCT VFR BLD AUTO: 33.8 % (ref 37–47)
HGB BLD-MCNC: 11.3 G/DL (ref 12–16)
MCH RBC QN AUTO: 31.7 PG (ref 27–33)
MCHC RBC AUTO-ENTMCNC: 33.4 G/DL (ref 33.6–35)
MCV RBC AUTO: 94.9 FL (ref 81.4–97.8)
PLATELET # BLD AUTO: 247 K/UL (ref 164–446)
PMV BLD AUTO: 9.9 FL (ref 9–12.9)
POTASSIUM SERPL-SCNC: 3.5 MMOL/L (ref 3.6–5.5)
PROT SERPL-MCNC: 5.4 G/DL (ref 6–8.2)
RBC # BLD AUTO: 3.56 M/UL (ref 4.2–5.4)
SODIUM SERPL-SCNC: 141 MMOL/L (ref 135–145)
WBC # BLD AUTO: 11.8 K/UL (ref 4.8–10.8)

## 2020-04-05 PROCEDURE — 700105 HCHG RX REV CODE 258: Performed by: SURGERY

## 2020-04-05 PROCEDURE — C9113 INJ PANTOPRAZOLE SODIUM, VIA: HCPCS | Performed by: SURGERY

## 2020-04-05 PROCEDURE — 770001 HCHG ROOM/CARE - MED/SURG/GYN PRIV*

## 2020-04-05 PROCEDURE — 700102 HCHG RX REV CODE 250 W/ 637 OVERRIDE(OP): Performed by: SURGERY

## 2020-04-05 PROCEDURE — 80053 COMPREHEN METABOLIC PANEL: CPT

## 2020-04-05 PROCEDURE — 85027 COMPLETE CBC AUTOMATED: CPT

## 2020-04-05 PROCEDURE — 700111 HCHG RX REV CODE 636 W/ 250 OVERRIDE (IP): Performed by: ANESTHESIOLOGY

## 2020-04-05 PROCEDURE — A9270 NON-COVERED ITEM OR SERVICE: HCPCS | Performed by: SURGERY

## 2020-04-05 PROCEDURE — 700111 HCHG RX REV CODE 636 W/ 250 OVERRIDE (IP): Performed by: SURGERY

## 2020-04-05 RX ORDER — TRAMADOL HYDROCHLORIDE 50 MG/1
50 TABLET ORAL EVERY 6 HOURS PRN
Status: DISCONTINUED | OUTPATIENT
Start: 2020-04-05 | End: 2020-04-07

## 2020-04-05 RX ORDER — CELECOXIB 200 MG/1
200 CAPSULE ORAL DAILY
Status: DISCONTINUED | OUTPATIENT
Start: 2020-04-05 | End: 2020-04-11 | Stop reason: HOSPADM

## 2020-04-05 RX ADMIN — CELECOXIB 200 MG: 200 CAPSULE ORAL at 10:41

## 2020-04-05 RX ADMIN — SODIUM CHLORIDE, POTASSIUM CHLORIDE, SODIUM LACTATE AND CALCIUM CHLORIDE: 600; 310; 30; 20 INJECTION, SOLUTION INTRAVENOUS at 23:06

## 2020-04-05 RX ADMIN — ENOXAPARIN SODIUM 40 MG: 100 INJECTION SUBCUTANEOUS at 09:15

## 2020-04-05 RX ADMIN — PANTOPRAZOLE SODIUM 40 MG: 40 INJECTION, POWDER, LYOPHILIZED, FOR SOLUTION INTRAVENOUS at 06:35

## 2020-04-05 RX ADMIN — ONDANSETRON 4 MG: 2 INJECTION INTRAMUSCULAR; INTRAVENOUS at 07:10

## 2020-04-05 RX ADMIN — ONDANSETRON 4 MG: 2 INJECTION INTRAMUSCULAR; INTRAVENOUS at 12:17

## 2020-04-05 ASSESSMENT — LIFESTYLE VARIABLES
EVER_SMOKED: NEVER
HAVE PEOPLE ANNOYED YOU BY CRITICIZING YOUR DRINKING: NO
EVER HAD A DRINK FIRST THING IN THE MORNING TO STEADY YOUR NERVES TO GET RID OF A HANGOVER: NO
EVER FELT BAD OR GUILTY ABOUT YOUR DRINKING: NO
ALCOHOL_USE: NO
TOTAL SCORE: 0
ON A TYPICAL DAY WHEN YOU DRINK ALCOHOL HOW MANY DRINKS DO YOU HAVE: 0
AVERAGE NUMBER OF DAYS PER WEEK YOU HAVE A DRINK CONTAINING ALCOHOL: 0
CONSUMPTION TOTAL: NEGATIVE
HOW MANY TIMES IN THE PAST YEAR HAVE YOU HAD 5 OR MORE DRINKS IN A DAY: 0
HAVE YOU EVER FELT YOU SHOULD CUT DOWN ON YOUR DRINKING: NO
TOTAL SCORE: 0
TOTAL SCORE: 0

## 2020-04-05 ASSESSMENT — ENCOUNTER SYMPTOMS: DIZZINESS: 1

## 2020-04-05 ASSESSMENT — PATIENT HEALTH QUESTIONNAIRE - PHQ9
2. FEELING DOWN, DEPRESSED, IRRITABLE, OR HOPELESS: NOT AT ALL
1. LITTLE INTEREST OR PLEASURE IN DOING THINGS: NOT AT ALL
SUM OF ALL RESPONSES TO PHQ9 QUESTIONS 1 AND 2: 0

## 2020-04-05 ASSESSMENT — FIBROSIS 4 INDEX: FIB4 SCORE: 3.4

## 2020-04-05 NOTE — PROGRESS NOTES
2 RN skin check complete with CATALINA Shannon.  Devices in place SCDs, BP cuff, pulse ox, silicone nasal cannula, Epidural, midline prevena, x2 Right DULCE MARIA     Skin assessed under the above mentioned devices.     Preventative measures in place including q2h turns, pillows and draw sheet used for repositioning.     Following areas of concern:   All bony prominences intact and blanching.   Midline prevena and RLQ DULCE MARIA drains x2

## 2020-04-05 NOTE — PROGRESS NOTES
Report called to [facility]. Pt's belongings packed up and with pt. Pt informed of new room. Awaiting transport at this time.

## 2020-04-05 NOTE — PROGRESS NOTES
"A&Ox4.   /60   Pulse 89   Temp 36.7 °C (98.1 °F) (Temporal)   Resp 18   Ht 1.626 m (5' 4\")   Wt 71.8 kg (158 lb 4.6 oz)   SpO2 91%   BMI 27.17 kg/m²   Pt states denies pain at this time.   Epidural in place. Site assessed, CDI, tape in place.    Neuro and sensation intact.   Saturating >90% on RA. Denies SOB.  Abdomen soft, + bowel sounds, + flatus, LBM PTA.   Tolerating clear diet, + nausea with excertion; medicated per MAR.   Ramirez taped in place for epidural, clear harley urine noted.   Pt ambulates x1 assist, intermittent dizziness. Fall precautions in place.   DULCE MARIA drain x2 RLQ, scant serosanguinous drainage.   Midline prevena in place, patent.   PIV running LR at 75ml/hr.   Updated on plan of care. Safety education provided. Bed locked in low. Call light within reach. Hourly rounding in place.        "

## 2020-04-05 NOTE — PROGRESS NOTES
This RN called into room by patient. Patient complaining of mild headache. When patient rolled onto side, this RN noticed the epidural tubing had become detached from the epidural wire. Fluid appeared to be leaking from the epidural wire. Anesthesia paged. Patient laid flat and end of epidural wire wrapped in sterile gauze. Epidural pump stopped.

## 2020-04-05 NOTE — PROGRESS NOTES
Trauma / Surgical Daily Progress Note    Date of Service  4/5/2020    Chief Complaint  69 y.o. female admitted 4/3/2020 with BILIARY, PANCREATIC CANCER    Interval Events    Ng out  To clears  Urine output brisk.  Decrease IVF  Off antibiotics  Lovenox yes.  Sequentials yes.    Transfer to [facility].      Review of Systems  Review of Systems     Vital Signs for last 24 hours  Pulse:  [78-99] 87  Resp:  [13-38] 25  BP: ()/(52-60) 124/55  SpO2:  [92 %-99 %] 95 %    Hemodynamic parameters for last 24 hours       Respiratory Data     Respiration: (!) 25, Pulse Oximetry: 95 %     Work Of Breathing / Effort: Mild  RUL Breath Sounds: Clear, RML Breath Sounds: Clear, RLL Breath Sounds: Diminished, CEASAR Breath Sounds: Clear, LLL Breath Sounds: Diminished    Physical Exam  Physical Exam  HENT:      Head: Normocephalic.   Eyes:      Pupils: Pupils are equal, round, and reactive to light.   Cardiovascular:      Rate and Rhythm: Regular rhythm.   Pulmonary:      Effort: No respiratory distress.   Abdominal:      Tenderness: There is abdominal tenderness.   Musculoskeletal:         General: No swelling.   Neurological:      General: No focal deficit present.      Mental Status: She is alert.   Psychiatric:         Mood and Affect: Mood normal.         Laboratory  Recent Results (from the past 24 hour(s))   CBC without Differential    Collection Time: 04/05/20  7:15 AM   Result Value Ref Range    WBC 11.8 (H) 4.8 - 10.8 K/uL    RBC 3.56 (L) 4.20 - 5.40 M/uL    Hemoglobin 11.3 (L) 12.0 - 16.0 g/dL    Hematocrit 33.8 (L) 37.0 - 47.0 %    MCV 94.9 81.4 - 97.8 fL    MCH 31.7 27.0 - 33.0 pg    MCHC 33.4 (L) 33.6 - 35.0 g/dL    RDW 48.0 35.9 - 50.0 fL    Platelet Count 247 164 - 446 K/uL    MPV 9.9 9.0 - 12.9 fL   Comp Metabolic Panel (CMP)    Collection Time: 04/05/20  7:15 AM   Result Value Ref Range    Sodium 141 135 - 145 mmol/L    Potassium 3.5 (L) 3.6 - 5.5 mmol/L    Chloride 107 96 - 112 mmol/L    Co2 22 20 - 33 mmol/L    Anion  Gap 12.0 7.0 - 16.0    Glucose 88 65 - 99 mg/dL    Bun 8 8 - 22 mg/dL    Creatinine 0.43 (L) 0.50 - 1.40 mg/dL    Calcium 8.2 (L) 8.5 - 10.5 mg/dL    AST(SGOT) 127 (H) 12 - 45 U/L    ALT(SGPT) 132 (H) 2 - 50 U/L    Alkaline Phosphatase 44 30 - 99 U/L    Total Bilirubin 0.7 0.1 - 1.5 mg/dL    Albumin 3.0 (L) 3.2 - 4.9 g/dL    Total Protein 5.4 (L) 6.0 - 8.2 g/dL    Globulin 2.4 1.9 - 3.5 g/dL    A-G Ratio 1.3 g/dL   ESTIMATED GFR    Collection Time: 04/05/20  7:15 AM   Result Value Ref Range    GFR If African American >60 >60 mL/min/1.73 m 2    GFR If Non African American >60 >60 mL/min/1.73 m 2       Fluids    Intake/Output Summary (Last 24 hours) at 4/5/2020 0954  Last data filed at 4/5/2020 0800  Gross per 24 hour   Intake 2499 ml   Output 855 ml   Net 1644 ml       Core Measures & Quality Metrics  Core Measures & Quality Metrics  VITALY Score  ETOH Screening    Assessment/Plan  Hypertension- (present on admission)  Assessment & Plan  Premorbid  A.M. lisinopril/hydrochlorothiazide  P.M. 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        Discussed patient condition with RN, RT and Pharmacy.  CRITICAL CARE TIME EXCLUDING PROCEDURES: 35    Minutes.  Transfer to [facility]

## 2020-04-05 NOTE — PROGRESS NOTES
2 RN skin check complete with CATALINA Sanz.  Devices in place SCDs, BP cuff, pulse ox, silicone nasal cannula, Epidural, midline prevena, RLQ DULCE MARIA drains x2.                Skin assessed under the above mentioned devices.                Preventative measures in place including q2h turns, pillows and draw sheet used for repositioning.     Following areas of concern:   ·           No skin breakdown noted, all bony prominences intact and blanching.   -           Midline prevena and RLQ DULCE MARIA drains x2           Wound consult placedYES/NO: no    Wound reported YES/NO: no  Appropriate LDAs opened YES/NO: yes

## 2020-04-05 NOTE — CARE PLAN
Problem: Mobility  Goal: Risk for activity intolerance will decrease  Outcome: PROGRESSING AS EXPECTED  Note: Up to chair with x1 minimal assist     Problem: Skin Integrity  Goal: Risk for impaired skin integrity will decrease  Outcome: PROGRESSING AS EXPECTED  Note: Problem: Skin Integrity  Goal: Skin Integrity is maintained or improved  Intervention: Alphonse Skin Risk Assessment  Alphonse assessed q shift-16, repositioned q 2hrs  Intervention: TURN EVERY 2 HOURS WHILE ON BEDREST  Pillows for positioning, mattress pressure 22, SCDs in place

## 2020-04-05 NOTE — PROGRESS NOTES
Surgical Progress Note    Author: Corwin Perez M.D. Date & Time created: 2020   9:30 AM     Interval Events:  POD 2  No NV  Comfortable  Slight dizziness on walking- epidural related  No pain    Review of Systems   Constitutional: Positive for malaise/fatigue.   Neurological: Positive for dizziness.   All other systems reviewed and are negative.    Hemodynamics:  No data recorded.  Monitored Temp: 37.8 °C (100 °F)  Pulse  Av.2  Min: 65  Max: 99   Blood Pressure : 124/55     Respiratory:    Respiration: (!) 25, Pulse Oximetry: 95 %     Work Of Breathing / Effort: Mild  RUL Breath Sounds: Clear, RML Breath Sounds: Clear, RLL Breath Sounds: Diminished, CEASAR Breath Sounds: Clear, LLL Breath Sounds: Diminished  Neuro:  GCS Total Kristi Coma Score: 15     Fluids:    Intake/Output Summary (Last 24 hours) at 2020 0930  Last data filed at 2020 0800  Gross per 24 hour   Intake 2499 ml   Output 855 ml   Net 1644 ml     Weight: 71.8 kg (158 lb 4.6 oz)  Current Diet Order   Procedures   • Diet Order Clear Liquid     Physical Exam  Vitals signs and nursing note reviewed.   Cardiovascular:      Rate and Rhythm: Normal rate and regular rhythm.   Pulmonary:      Effort: Pulmonary effort is normal.      Breath sounds: Normal breath sounds.   Abdominal:      General: Abdomen is flat.      Palpations: Abdomen is soft.      Comments: DULCE MARIA drain serous sanguinous       Labs:  Recent Results (from the past 24 hour(s))   CBC without Differential    Collection Time: 20  7:15 AM   Result Value Ref Range    WBC 11.8 (H) 4.8 - 10.8 K/uL    RBC 3.56 (L) 4.20 - 5.40 M/uL    Hemoglobin 11.3 (L) 12.0 - 16.0 g/dL    Hematocrit 33.8 (L) 37.0 - 47.0 %    MCV 94.9 81.4 - 97.8 fL    MCH 31.7 27.0 - 33.0 pg    MCHC 33.4 (L) 33.6 - 35.0 g/dL    RDW 48.0 35.9 - 50.0 fL    Platelet Count 247 164 - 446 K/uL    MPV 9.9 9.0 - 12.9 fL   Comp Metabolic Panel (CMP)    Collection Time: 20  7:15 AM   Result Value Ref Range     Sodium 141 135 - 145 mmol/L    Potassium 3.5 (L) 3.6 - 5.5 mmol/L    Chloride 107 96 - 112 mmol/L    Co2 22 20 - 33 mmol/L    Anion Gap 12.0 7.0 - 16.0    Glucose 88 65 - 99 mg/dL    Bun 8 8 - 22 mg/dL    Creatinine 0.43 (L) 0.50 - 1.40 mg/dL    Calcium 8.2 (L) 8.5 - 10.5 mg/dL    AST(SGOT) 127 (H) 12 - 45 U/L    ALT(SGPT) 132 (H) 2 - 50 U/L    Alkaline Phosphatase 44 30 - 99 U/L    Total Bilirubin 0.7 0.1 - 1.5 mg/dL    Albumin 3.0 (L) 3.2 - 4.9 g/dL    Total Protein 5.4 (L) 6.0 - 8.2 g/dL    Globulin 2.4 1.9 - 3.5 g/dL    A-G Ratio 1.3 g/dL   ESTIMATED GFR    Collection Time: 04/05/20  7:15 AM   Result Value Ref Range    GFR If African American >60 >60 mL/min/1.73 m 2    GFR If Non African American >60 >60 mL/min/1.73 m 2     Medical Decision Making, by Problem:  Active Hospital Problems    Diagnosis   • 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 lisinopril/hydrochlorothiazide  P.M. lisinopril  PRN medication ordered     • Heart valve disease [I38]     Priority: Medium     mitral valve insufficiency  On Lopressor  Restart with PO once NGT out       • High cholesterol [E78.00]     Priority: Medium     Premorbid on statin  Restart when taking PO     • Acute blood loss anemia [D62]     Transfuse 1 unit PRBC's for hemoglobin less than 7.       Plan:  Transfer to [facility]  Plan DC epidural on Tuesday at earliest  Ambulate  clears    Quality Measures:  Quality-Core Measures    Discussed patient condition with Family and RN

## 2020-04-05 NOTE — PROGRESS NOTES
2 RN Skin Check    2 RN skin check complete with CATALINA Garcia.   Devices in place: Glasses, SCDs,  finger probe.  Skin assessed under devices: yes.  Confirmed pressure ulcers found on: n/a.  New potential pressure ulcers noted on n/a. Wound consult placed N/A.  The following interventions in place Pillows and Mepilex.    Midline with prevena.   Two DULCE MARIA drains to RLQ, dressing CDI, LUBNA site.   Ramirez taped in place, skin CDI.   Sacrum pink and intact, prophylactic mepilex in place.   Epidural taped in place over right shoulder, CDI.   All other skin and bony prominences intact and blanching.

## 2020-04-06 ENCOUNTER — ANESTHESIA (OUTPATIENT)
Dept: ANESTHESIOLOGY | Facility: MEDICAL CENTER | Age: 70
End: 2020-04-06

## 2020-04-06 ENCOUNTER — ANESTHESIA EVENT (OUTPATIENT)
Dept: ANESTHESIOLOGY | Facility: MEDICAL CENTER | Age: 70
End: 2020-04-06

## 2020-04-06 LAB
ALBUMIN SERPL BCP-MCNC: 2.8 G/DL (ref 3.2–4.9)
ALBUMIN/GLOB SERPL: 1.2 G/DL
ALP SERPL-CCNC: 43 U/L (ref 30–99)
ALT SERPL-CCNC: 85 U/L (ref 2–50)
ANION GAP SERPL CALC-SCNC: 11 MMOL/L (ref 7–16)
AST SERPL-CCNC: 55 U/L (ref 12–45)
BACTERIA WND AEROBE CULT: NORMAL
BILIRUB SERPL-MCNC: 0.7 MG/DL (ref 0.1–1.5)
BUN SERPL-MCNC: 7 MG/DL (ref 8–22)
CALCIUM SERPL-MCNC: 8.1 MG/DL (ref 8.5–10.5)
CHLORIDE SERPL-SCNC: 105 MMOL/L (ref 96–112)
CO2 SERPL-SCNC: 23 MMOL/L (ref 20–33)
CREAT SERPL-MCNC: 0.42 MG/DL (ref 0.5–1.4)
ERYTHROCYTE [DISTWIDTH] IN BLOOD BY AUTOMATED COUNT: 48 FL (ref 35.9–50)
GLOBULIN SER CALC-MCNC: 2.4 G/DL (ref 1.9–3.5)
GLUCOSE SERPL-MCNC: 85 MG/DL (ref 65–99)
GRAM STN SPEC: NORMAL
HCT VFR BLD AUTO: 30.9 % (ref 37–47)
HGB BLD-MCNC: 10.2 G/DL (ref 12–16)
MCH RBC QN AUTO: 31.9 PG (ref 27–33)
MCHC RBC AUTO-ENTMCNC: 33 G/DL (ref 33.6–35)
MCV RBC AUTO: 96.6 FL (ref 81.4–97.8)
PLATELET # BLD AUTO: 193 K/UL (ref 164–446)
PMV BLD AUTO: 10.2 FL (ref 9–12.9)
POTASSIUM SERPL-SCNC: 3.3 MMOL/L (ref 3.6–5.5)
PROT SERPL-MCNC: 5.2 G/DL (ref 6–8.2)
RBC # BLD AUTO: 3.2 M/UL (ref 4.2–5.4)
SIGNIFICANT IND 70042: NORMAL
SITE SITE: NORMAL
SODIUM SERPL-SCNC: 139 MMOL/L (ref 135–145)
SOURCE SOURCE: NORMAL
WBC # BLD AUTO: 8.8 K/UL (ref 4.8–10.8)

## 2020-04-06 PROCEDURE — 85027 COMPLETE CBC AUTOMATED: CPT

## 2020-04-06 PROCEDURE — 97535 SELF CARE MNGMENT TRAINING: CPT

## 2020-04-06 PROCEDURE — 700111 HCHG RX REV CODE 636 W/ 250 OVERRIDE (IP): Performed by: SURGERY

## 2020-04-06 PROCEDURE — 770001 HCHG ROOM/CARE - MED/SURG/GYN PRIV*

## 2020-04-06 PROCEDURE — P9047 ALBUMIN (HUMAN), 25%, 50ML: HCPCS | Performed by: SURGERY

## 2020-04-06 PROCEDURE — 97530 THERAPEUTIC ACTIVITIES: CPT

## 2020-04-06 PROCEDURE — 97116 GAIT TRAINING THERAPY: CPT

## 2020-04-06 PROCEDURE — A9270 NON-COVERED ITEM OR SERVICE: HCPCS | Performed by: SURGERY

## 2020-04-06 PROCEDURE — 700105 HCHG RX REV CODE 258: Performed by: SURGERY

## 2020-04-06 PROCEDURE — C9113 INJ PANTOPRAZOLE SODIUM, VIA: HCPCS | Performed by: SURGERY

## 2020-04-06 PROCEDURE — 700102 HCHG RX REV CODE 250 W/ 637 OVERRIDE(OP): Performed by: SURGERY

## 2020-04-06 PROCEDURE — 36415 COLL VENOUS BLD VENIPUNCTURE: CPT

## 2020-04-06 PROCEDURE — 80053 COMPREHEN METABOLIC PANEL: CPT

## 2020-04-06 RX ORDER — ALBUMIN (HUMAN) 12.5 G/50ML
12.5 SOLUTION INTRAVENOUS ONCE
Status: COMPLETED | OUTPATIENT
Start: 2020-04-06 | End: 2020-04-06

## 2020-04-06 RX ORDER — SODIUM CHLORIDE 9 MG/ML
250 INJECTION, SOLUTION INTRAVENOUS ONCE
Status: COMPLETED | OUTPATIENT
Start: 2020-04-06 | End: 2020-04-06

## 2020-04-06 RX ORDER — CALCIUM CARBONATE 500 MG/1
500 TABLET, CHEWABLE ORAL 3 TIMES DAILY
Status: DISCONTINUED | OUTPATIENT
Start: 2020-04-06 | End: 2020-04-11 | Stop reason: HOSPADM

## 2020-04-06 RX ORDER — OMEPRAZOLE 20 MG/1
20 CAPSULE, DELAYED RELEASE ORAL DAILY
Status: DISCONTINUED | OUTPATIENT
Start: 2020-04-06 | End: 2020-04-11 | Stop reason: HOSPADM

## 2020-04-06 RX ORDER — SODIUM CHLORIDE 9 MG/ML
INJECTION, SOLUTION INTRAVENOUS
Status: DISCONTINUED
Start: 2020-04-06 | End: 2020-04-06

## 2020-04-06 RX ADMIN — ANTACID TABLETS 500 MG: 500 TABLET, CHEWABLE ORAL at 18:02

## 2020-04-06 RX ADMIN — SODIUM CHLORIDE, POTASSIUM CHLORIDE, SODIUM LACTATE AND CALCIUM CHLORIDE: 600; 310; 30; 20 INJECTION, SOLUTION INTRAVENOUS at 18:01

## 2020-04-06 RX ADMIN — CELECOXIB 200 MG: 200 CAPSULE ORAL at 05:49

## 2020-04-06 RX ADMIN — ENOXAPARIN SODIUM 40 MG: 100 INJECTION SUBCUTANEOUS at 09:53

## 2020-04-06 RX ADMIN — SODIUM CHLORIDE 250 ML: 9 INJECTION, SOLUTION INTRAVENOUS at 02:19

## 2020-04-06 RX ADMIN — ROPIVACAINE HYDROCHLORIDE: 10 INJECTION, SOLUTION EPIDURAL at 10:22

## 2020-04-06 RX ADMIN — ALBUMIN (HUMAN) 12.5 G: 5 SOLUTION INTRAVENOUS at 01:29

## 2020-04-06 RX ADMIN — OMEPRAZOLE 20 MG: 20 CAPSULE, DELAYED RELEASE ORAL at 09:53

## 2020-04-06 RX ADMIN — PANTOPRAZOLE SODIUM 40 MG: 40 INJECTION, POWDER, LYOPHILIZED, FOR SOLUTION INTRAVENOUS at 05:50

## 2020-04-06 RX ADMIN — ANTACID TABLETS 500 MG: 500 TABLET, CHEWABLE ORAL at 09:53

## 2020-04-06 ASSESSMENT — COGNITIVE AND FUNCTIONAL STATUS - GENERAL
DAILY ACTIVITIY SCORE: 22
TURNING FROM BACK TO SIDE WHILE IN FLAT BAD: A LITTLE
DRESSING REGULAR LOWER BODY CLOTHING: A LITTLE
HELP NEEDED FOR BATHING: A LITTLE
SUGGESTED CMS G CODE MODIFIER MOBILITY: CI
MOBILITY SCORE: 23
SUGGESTED CMS G CODE MODIFIER DAILY ACTIVITY: CJ

## 2020-04-06 ASSESSMENT — GAIT ASSESSMENTS
DISTANCE (FEET): 200
DEVIATION: BRADYKINETIC
GAIT LEVEL OF ASSIST: SUPERVISED

## 2020-04-06 ASSESSMENT — ENCOUNTER SYMPTOMS: ABDOMINAL PAIN: 1

## 2020-04-06 NOTE — ANESTHESIA POST-OP FOLLOW-UP NOTE
"Anesthesia Pain Service Note    Type:  Epidural catheter    Patient: Silvio Cooney    Patient seen and examined. and Patient chart reviewed.     /67   Pulse 91   Temp 36.7 °C (98.1 °F) (Temporal)   Resp 16   Ht 1.626 m (5' 4\")   Wt 71.8 kg (158 lb 4.6 oz)   SpO2 92%   BMI 27.17 kg/m²     Complaints:  No complaints.    Pain:   2/10  LOC:   Awake    Rate:  2      Exam:  Vital signs stable, Afebrile and Site clean, dry, intact without tenderness or erythema    Impression:  Adequate analgesia    Assessment & Plan:  Acute post-procedural pain (G89.18)     No change  - continue      Bereket Robles D.O.  4/6/2020  9:30 AM  "

## 2020-04-06 NOTE — ANESTHESIA POST-OP FOLLOW-UP NOTE
"Anesthesia Pain Service Note    Type:  Epidural catheter    Patient: Silvio Cooney    Patient seen and examined. and Patient chart reviewed.     /61   Pulse 94   Temp 37.1 °C (98.7 °F) (Temporal)   Resp 18   Ht 1.626 m (5' 4\")   Wt 71.8 kg (158 lb 4.6 oz)   SpO2 93%   BMI 27.17 kg/m²     Complaints:  No complaints.    Pain:   0/10    LOC:   Awake    Rate:  *2  **    Exam:  Vital signs stable, Afebrile and Site clean, dry, intact without tenderness or erythema    Impression:  Adequate analgesia    Assessment & Plan:  Acute post-procedural pain (G89.18)     No change  - continue      Srinivas Mcqueen M.D.  4/5/2020  7:24 PM  "

## 2020-04-06 NOTE — PROGRESS NOTES
Assumed care at 1845. Pt resting in bed. A&0x 4  Ambulates with 1 person assist  Tolerating clears  abd midline prevena. Rt jpx 2  Denies pain. Epidural running at 2ml/hr  Ramirez catheter in place  O2 @ 0.5LNC while sleeping. On RA while awake  Denies flatus or bm yet  Bed alarm in place. Call light within reach. Hourly rounding in place

## 2020-04-06 NOTE — PROGRESS NOTES
Surgical Progress Note    Author: Corwin Perez M.D. Date & Time created: 2020   9:08 AM     Interval Events:  Postop day 3 status post Whipple procedure.  Tolerating full liquid diet.  Labs okay.  Minimal to no pain.  Little bit of reflux managed with p.o. omeprazole and Tums.  Pathology still pending  Review of Systems   Gastrointestinal: Positive for abdominal pain.   All other systems reviewed and are negative.    Hemodynamics:  Temp (24hrs), Av.8 °C (98.2 °F), Min:36.5 °C (97.7 °F), Max:37.1 °C (98.8 °F)  Temperature: 36.7 °C (98.1 °F), Monitored Temp: 37.7 °C (99.9 °F)  Pulse  Av.2  Min: 65  Max: 99   Blood Pressure : 140/67     Respiratory:    Respiration: 16, Pulse Oximetry: 92 %        RUL Breath Sounds: Clear, RML Breath Sounds: Clear, RLL Breath Sounds: Clear, CEASAR Breath Sounds: Clear, LLL Breath Sounds: Diminished  Neuro:  GCS Total Kristi Coma Score: 15     Fluids:    Intake/Output Summary (Last 24 hours) at 2020 0908  Last data filed at 2020 0730  Gross per 24 hour   Intake 2206 ml   Output 950 ml   Net 1256 ml        Current Diet Order   Procedures   • Diet Order Full Liquid, Diabetic     Physical Exam  Vitals signs and nursing note reviewed.   Cardiovascular:      Rate and Rhythm: Normal rate and regular rhythm.   Pulmonary:      Effort: Pulmonary effort is normal.      Breath sounds: Normal breath sounds.   Abdominal:      General: Abdomen is flat. Bowel sounds are normal.      Palpations: Abdomen is soft.      Comments: Minimal pain, epidural working terrific.  We will plan to remove it tomorrow  DULCE MARIA drains are serosanguineous no obvious bile.       Labs:  Recent Results (from the past 24 hour(s))   CBC without Differential    Collection Time: 20  4:28 AM   Result Value Ref Range    WBC 8.8 4.8 - 10.8 K/uL    RBC 3.20 (L) 4.20 - 5.40 M/uL    Hemoglobin 10.2 (L) 12.0 - 16.0 g/dL    Hematocrit 30.9 (L) 37.0 - 47.0 %    MCV 96.6 81.4 - 97.8 fL    MCH 31.9 27.0 -  33.0 pg    MCHC 33.0 (L) 33.6 - 35.0 g/dL    RDW 48.0 35.9 - 50.0 fL    Platelet Count 193 164 - 446 K/uL    MPV 10.2 9.0 - 12.9 fL   Comp Metabolic Panel (CMP)    Collection Time: 04/06/20  4:28 AM   Result Value Ref Range    Sodium 139 135 - 145 mmol/L    Potassium 3.3 (L) 3.6 - 5.5 mmol/L    Chloride 105 96 - 112 mmol/L    Co2 23 20 - 33 mmol/L    Anion Gap 11.0 7.0 - 16.0    Glucose 85 65 - 99 mg/dL    Bun 7 (L) 8 - 22 mg/dL    Creatinine 0.42 (L) 0.50 - 1.40 mg/dL    Calcium 8.1 (L) 8.5 - 10.5 mg/dL    AST(SGOT) 55 (H) 12 - 45 U/L    ALT(SGPT) 85 (H) 2 - 50 U/L    Alkaline Phosphatase 43 30 - 99 U/L    Total Bilirubin 0.7 0.1 - 1.5 mg/dL    Albumin 2.8 (L) 3.2 - 4.9 g/dL    Total Protein 5.2 (L) 6.0 - 8.2 g/dL    Globulin 2.4 1.9 - 3.5 g/dL    A-G Ratio 1.2 g/dL   ESTIMATED GFR    Collection Time: 04/06/20  4:28 AM   Result Value Ref Range    GFR If African American >60 >60 mL/min/1.73 m 2    GFR If Non African American >60 >60 mL/min/1.73 m 2     Medical Decision Making, by Problem:  Active Hospital Problems    Diagnosis   • 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 lisinopril/hydrochlorothiazide  P.M. lisinopril  PRN medication ordered     • Heart valve disease [I38]     Priority: Medium     mitral valve insufficiency  On Lopressor  Restart with PO once NGT out       • High cholesterol [E78.00]     Priority: Medium     Premorbid on statin  Restart when taking PO     • Acute blood loss anemia [D62]     Transfuse 1 unit PRBC's for hemoglobin less than 7.       Plan:  Full liquid diet  DC epidural in the morning of Tuesday, order placed already  Still awaiting pathology report.  Ambulate.    Quality Measures:  Quality-Core Measures    Discussed patient  condition with Family and RN

## 2020-04-06 NOTE — THERAPY
"PT goals have been met.  Pt denies pain at this time.  She is able to move supine to/from sitting w/o assist.  She is able to stand and ambulate in the hallway w/o AD w/o loss of balance.  No longer presents w/ acute PT needs.  Recommend oob/amb prn w/ nsg.  PT will be available for d/c needs only.    Physical Therapy Treatment completed.   Bed Mobility:  Supine to Sit: Supervised  Transfers: Sit to Stand: Supervised  Gait: Level Of Assist: Supervised with No Equipment Needed       Plan of Care: Patient with no further skilled PT needs in the acute care setting at this time  Discharge Recommendations: Equipment: No Equipment Needed. Post-acute therapy   Recommend home health transitional care for continued physical therapy services.     See \"Rehab Therapy-Acute\" Patient Summary Report for complete documentation.       "

## 2020-04-06 NOTE — CARE PLAN
Problem: Safety  Goal: Will remain free from injury  Outcome: PROGRESSING AS EXPECTED  Goal: Will remain free from falls  Outcome: PROGRESSING AS EXPECTED   Updated about POC. Reinforce call light use. Pt acknowledged understandin    Problem: Venous Thromboembolism (VTW)/Deep Vein Thrombosis (DVT) Prevention:  Goal: Patient will participate in Venous Thrombosis (VTE)/Deep Vein Thrombosis (DVT)Prevention Measures  Outcome: PROGRESSING AS EXPECTED  Scds in place     Problem: Pain Management  Goal: Pain level will decrease to patient's comfort goal  Outcome: PROGRESSING AS EXPECTED  Pain controlled with epidural at 2ml/hr

## 2020-04-07 LAB
ALBUMIN SERPL BCP-MCNC: 2.5 G/DL (ref 3.2–4.9)
ALBUMIN/GLOB SERPL: 1.2 G/DL
ALP SERPL-CCNC: 42 U/L (ref 30–99)
ALT SERPL-CCNC: 57 U/L (ref 2–50)
AMYLASE FLD-CCNC: 154 U/L
AMYLASE FLD-CCNC: 35 U/L
ANION GAP SERPL CALC-SCNC: 7 MMOL/L (ref 7–16)
AST SERPL-CCNC: 31 U/L (ref 12–45)
BILIRUB SERPL-MCNC: 0.4 MG/DL (ref 0.1–1.5)
BODY FLD TYPE: NORMAL
BODY FLD TYPE: NORMAL
BUN SERPL-MCNC: 6 MG/DL (ref 8–22)
CALCIUM SERPL-MCNC: 7.8 MG/DL (ref 8.5–10.5)
CHLORIDE SERPL-SCNC: 107 MMOL/L (ref 96–112)
CO2 SERPL-SCNC: 25 MMOL/L (ref 20–33)
CREAT SERPL-MCNC: 0.41 MG/DL (ref 0.5–1.4)
ERYTHROCYTE [DISTWIDTH] IN BLOOD BY AUTOMATED COUNT: 45 FL (ref 35.9–50)
GLOBULIN SER CALC-MCNC: 2.1 G/DL (ref 1.9–3.5)
GLUCOSE SERPL-MCNC: 114 MG/DL (ref 65–99)
HCT VFR BLD AUTO: 27.9 % (ref 37–47)
HGB BLD-MCNC: 9.5 G/DL (ref 12–16)
MCH RBC QN AUTO: 31.8 PG (ref 27–33)
MCHC RBC AUTO-ENTMCNC: 34.1 G/DL (ref 33.6–35)
MCV RBC AUTO: 93.3 FL (ref 81.4–97.8)
PLATELET # BLD AUTO: 227 K/UL (ref 164–446)
PMV BLD AUTO: 10 FL (ref 9–12.9)
POTASSIUM SERPL-SCNC: 3.2 MMOL/L (ref 3.6–5.5)
PROT SERPL-MCNC: 4.6 G/DL (ref 6–8.2)
RBC # BLD AUTO: 2.99 M/UL (ref 4.2–5.4)
SODIUM SERPL-SCNC: 139 MMOL/L (ref 135–145)
WBC # BLD AUTO: 7.1 K/UL (ref 4.8–10.8)

## 2020-04-07 PROCEDURE — 85027 COMPLETE CBC AUTOMATED: CPT

## 2020-04-07 PROCEDURE — 700102 HCHG RX REV CODE 250 W/ 637 OVERRIDE(OP): Performed by: SURGERY

## 2020-04-07 PROCEDURE — 80053 COMPREHEN METABOLIC PANEL: CPT

## 2020-04-07 PROCEDURE — 700111 HCHG RX REV CODE 636 W/ 250 OVERRIDE (IP): Performed by: ANESTHESIOLOGY

## 2020-04-07 PROCEDURE — 36415 COLL VENOUS BLD VENIPUNCTURE: CPT

## 2020-04-07 PROCEDURE — 770001 HCHG ROOM/CARE - MED/SURG/GYN PRIV*

## 2020-04-07 PROCEDURE — A9270 NON-COVERED ITEM OR SERVICE: HCPCS | Performed by: SURGERY

## 2020-04-07 PROCEDURE — 82150 ASSAY OF AMYLASE: CPT | Mod: 91

## 2020-04-07 RX ORDER — TRAMADOL HYDROCHLORIDE 50 MG/1
50-100 TABLET ORAL EVERY 6 HOURS PRN
Status: DISCONTINUED | OUTPATIENT
Start: 2020-04-07 | End: 2020-04-11 | Stop reason: HOSPADM

## 2020-04-07 RX ORDER — ACETAMINOPHEN 500 MG
500 TABLET ORAL EVERY 6 HOURS PRN
Status: DISCONTINUED | OUTPATIENT
Start: 2020-04-07 | End: 2020-04-11 | Stop reason: HOSPADM

## 2020-04-07 RX ADMIN — ANTACID TABLETS 500 MG: 500 TABLET, CHEWABLE ORAL at 05:11

## 2020-04-07 RX ADMIN — ACETAMINOPHEN 500 MG: 500 TABLET ORAL at 17:59

## 2020-04-07 RX ADMIN — POTASSIUM BICARBONATE 37.5 MEQ: 978 TABLET, EFFERVESCENT ORAL at 08:23

## 2020-04-07 RX ADMIN — ONDANSETRON 4 MG: 2 INJECTION INTRAMUSCULAR; INTRAVENOUS at 20:52

## 2020-04-07 RX ADMIN — CELECOXIB 200 MG: 200 CAPSULE ORAL at 05:11

## 2020-04-07 RX ADMIN — OMEPRAZOLE 20 MG: 20 CAPSULE, DELAYED RELEASE ORAL at 05:11

## 2020-04-07 RX ADMIN — ANTACID TABLETS 500 MG: 500 TABLET, CHEWABLE ORAL at 12:07

## 2020-04-07 RX ADMIN — ANTACID TABLETS 500 MG: 500 TABLET, CHEWABLE ORAL at 18:00

## 2020-04-07 RX ADMIN — TRAMADOL HYDROCHLORIDE 50 MG: 50 TABLET, FILM COATED ORAL at 08:24

## 2020-04-07 ASSESSMENT — ENCOUNTER SYMPTOMS: ABDOMINAL PAIN: 1

## 2020-04-07 NOTE — CARE PLAN
Problem: Communication  Goal: The ability to communicate needs accurately and effectively will improve  Outcome: PROGRESSING AS EXPECTED  Note: Updated on POC     Problem: Safety  Goal: Will remain free from injury  Outcome: PROGRESSING AS EXPECTED  Note: Uses call light appropriately     Problem: Bowel/Gastric:  Goal: Will not experience complications related to bowel motility  Outcome: PROGRESSING AS EXPECTED  Note: Positive flatus     Problem: Pain Management  Goal: Pain level will decrease to patient's comfort goal  Outcome: PROGRESSING AS EXPECTED  Flowsheets (Taken 4/6/2020 1900)  Pain Rating Scale (NPRS): 0  Note: Assessing pain q4h

## 2020-04-07 NOTE — CARE PLAN
Problem: Knowledge Deficit  Goal: Knowledge of disease process/condition, treatment plan, diagnostic tests, and medications will improve  Outcome: PROGRESSING AS EXPECTED     Problem: Pain Management  Goal: Pain level will decrease to patient's comfort goal  Outcome: PROGRESSING AS EXPECTED     Problem: Fluid Volume:  Goal: Will maintain balanced intake and output  Outcome: PROGRESSING AS EXPECTED     Problem: Respiratory:  Goal: Respiratory status will improve  Outcome: PROGRESSING AS EXPECTED

## 2020-04-07 NOTE — CARE PLAN
Problem: Pain Management  Goal: Pain level will decrease to patient's comfort goal  Outcome: PROGRESSING AS EXPECTED  Epidural removed. Oral pain medication given per MAR. Patient has been stating minimal pain.    Problem: Mobility  Goal: Risk for activity intolerance will decrease  Outcome: PROGRESSING AS EXPECTED  Patient eager to ambulate. Will ambulate TID.

## 2020-04-07 NOTE — PROGRESS NOTES
Surgical Progress Note    Author: Corwin Perez M.D. Date & Time created: 2020   12:07 PM     Interval Events:  Postop day 4 status post Whipple for large tumor in the head of the pancreas and bile duct.  Tolerating full liquid diet without any nausea or vomiting.  DULCE MARIA drains are only putting out serosanguineous.  Pathology still pending this morning    Review of Systems   Gastrointestinal: Positive for abdominal pain.   All other systems reviewed and are negative.    Hemodynamics:  Temp (24hrs), Av.7 °C (98.1 °F), Min:36.2 °C (97.2 °F), Max:37.1 °C (98.8 °F)  Temperature: 37.1 °C (98.8 °F)  Pulse  Av.4  Min: 65  Max: 99   Blood Pressure : (!) 161/78(RN notified)     Respiratory:    Respiration: 20, Pulse Oximetry: 94 %        RUL Breath Sounds: Clear, RML Breath Sounds: Clear, RLL Breath Sounds: Diminished, CEASAR Breath Sounds: Clear, LLL Breath Sounds: Diminished  Neuro:  GCS       Fluids:    Intake/Output Summary (Last 24 hours) at 2020 1207  Last data filed at 2020 1130  Gross per 24 hour   Intake 1212.5 ml   Output 2216 ml   Net -1003.5 ml        Current Diet Order   Procedures   • Diet Order Low Fiber(GI Soft)     Physical Exam  Vitals signs and nursing note reviewed.   Cardiovascular:      Rate and Rhythm: Normal rate and regular rhythm.   Pulmonary:      Effort: Pulmonary effort is normal.      Breath sounds: Normal breath sounds.   Abdominal:      General: Abdomen is flat. Bowel sounds are normal.      Palpations: Abdomen is soft.      Tenderness: There is abdominal tenderness.      Comments: Minimal pain, epidural just being removed.  Tolerating a full liquid diet with no nausea or vomiting.  DULCE MARIA drain is serosanguineous.  DULCE MARIA drain amylase for drain 1 is 35 and DULCE MARIA drain amylase for #2 is 154.       Labs:  Recent Results (from the past 24 hour(s))   CBC without Differential    Collection Time: 20 12:44 AM   Result Value Ref Range    WBC 7.1 4.8 - 10.8 K/uL    RBC 2.99 (L)  4.20 - 5.40 M/uL    Hemoglobin 9.5 (L) 12.0 - 16.0 g/dL    Hematocrit 27.9 (L) 37.0 - 47.0 %    MCV 93.3 81.4 - 97.8 fL    MCH 31.8 27.0 - 33.0 pg    MCHC 34.1 33.6 - 35.0 g/dL    RDW 45.0 35.9 - 50.0 fL    Platelet Count 227 164 - 446 K/uL    MPV 10.0 9.0 - 12.9 fL   Comp Metabolic Panel (CMP)    Collection Time: 04/07/20 12:44 AM   Result Value Ref Range    Sodium 139 135 - 145 mmol/L    Potassium 3.2 (L) 3.6 - 5.5 mmol/L    Chloride 107 96 - 112 mmol/L    Co2 25 20 - 33 mmol/L    Anion Gap 7.0 7.0 - 16.0    Glucose 114 (H) 65 - 99 mg/dL    Bun 6 (L) 8 - 22 mg/dL    Creatinine 0.41 (L) 0.50 - 1.40 mg/dL    Calcium 7.8 (L) 8.5 - 10.5 mg/dL    AST(SGOT) 31 12 - 45 U/L    ALT(SGPT) 57 (H) 2 - 50 U/L    Alkaline Phosphatase 42 30 - 99 U/L    Total Bilirubin 0.4 0.1 - 1.5 mg/dL    Albumin 2.5 (L) 3.2 - 4.9 g/dL    Total Protein 4.6 (L) 6.0 - 8.2 g/dL    Globulin 2.1 1.9 - 3.5 g/dL    A-G Ratio 1.2 g/dL   ESTIMATED GFR    Collection Time: 04/07/20 12:44 AM   Result Value Ref Range    GFR If African American >60 >60 mL/min/1.73 m 2    GFR If Non African American >60 >60 mL/min/1.73 m 2   FLUID AMYLASE    Collection Time: 04/07/20  7:18 AM   Result Value Ref Range    Body Fluid Amylase 154 U/L   FLUID AMYLASE    Collection Time: 04/07/20  9:54 AM   Result Value Ref Range    Body Fluid Amylase 35 U/L     Medical Decision Making, by Problem:  Active Hospital Problems    Diagnosis   • 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 lisinopril/hydrochlorothiazide  P.M. lisinopril  PRN medication ordered     • Heart valve disease [I38]     Priority: Medium     mitral valve insufficiency  On Lopressor  Restart with PO once NGT out       • High cholesterol [E78.00]     Priority:  Medium     Premorbid on statin  Restart when taking PO     • Acute blood loss anemia [D62]     Transfuse 1 unit PRBC's for hemoglobin less than 7.       Plan:  DC both DULCE MARIA drains.  DC epidural and Ramirez.  General diet.  Possible DC home in the morning.    Quality Measures:  Quality-Core Measures    Discussed patient condition with Family and RN

## 2020-04-07 NOTE — PROGRESS NOTES
A&Ox4, VSS. Denies chest pain, denies SOB. O2 97% on 0.5L NC. Denies numbness/tingling. + CMS in bilateral LEs. Denies N/V. + void into [place], + flatus. SBA x1. Midline incision dressing CDI, no leaks. No leaks at both DULCE MARIA sites. Epidural dressing CDI.    Call light and belongings within reach, side rails up x2, bed in locked and lowest position, non-skid socks being worn, and hourly rounding in place. All questions and concerns addressed at this time, all needs met at this time.

## 2020-04-07 NOTE — PROGRESS NOTES
Assumed care of patient. Patient denying pain or nausea. Medicated per MAR for pain s/p epidural removal. Gauze and tape placed by NOC RN. No drainage noted. Midline incision with a prevena wound vac. DULCE MARIA drain x2 to RUQ. Drain #1 has minimal serous output and drain #2 has large amounts of sero sang output. Ramirez to down drain due to epidural; this RN will remove at 0930. Patient is ambulating. No other needs at this time. Call light within reach.

## 2020-04-07 NOTE — THERAPY
"Occupational Therapy Treatment completed with focus on ADLs, ADL transfers and patient education.  Functional Status:  Pt supine in bed on arrival.  Pt very pleasant & motivated. Pt was supervision for supine to sit EOB.   Pt able to dress LE with Min A.  Pt amb to sink where she stood to groom for 12 min.  Pt educated on homemaking tasks to avoid upon D/C home.  Pt stated her  will be able to provide 24/7 assist as needed.  Pt encouraged to be OOb for meals & amb with Nsg.  Plan of Care: Patient with no further skilled OT needs in the acute care setting at this time  Discharge Recommendations:  Equipment No Equipment Needed. Post-acute therapy Patient will not be actively followed for occupational therapy services at this time, however may be seen if requested by physician for 1 more visit within 30 days to address any discharge or equipment needs.       See \"Rehab Therapy-Acute\" Patient Summary Report for complete documentation.   "

## 2020-04-08 LAB
ALBUMIN SERPL BCP-MCNC: 2.6 G/DL (ref 3.2–4.9)
ALBUMIN/GLOB SERPL: 1 G/DL
ALP SERPL-CCNC: 53 U/L (ref 30–99)
ALT SERPL-CCNC: 39 U/L (ref 2–50)
ANION GAP SERPL CALC-SCNC: 10 MMOL/L (ref 7–16)
AST SERPL-CCNC: 21 U/L (ref 12–45)
BACTERIA SPEC ANAEROBE CULT: NORMAL
BILIRUB SERPL-MCNC: 0.4 MG/DL (ref 0.1–1.5)
BUN SERPL-MCNC: 6 MG/DL (ref 8–22)
CALCIUM SERPL-MCNC: 8 MG/DL (ref 8.5–10.5)
CHLORIDE SERPL-SCNC: 109 MMOL/L (ref 96–112)
CO2 SERPL-SCNC: 22 MMOL/L (ref 20–33)
CREAT SERPL-MCNC: 0.51 MG/DL (ref 0.5–1.4)
ERYTHROCYTE [DISTWIDTH] IN BLOOD BY AUTOMATED COUNT: 44.9 FL (ref 35.9–50)
GLOBULIN SER CALC-MCNC: 2.5 G/DL (ref 1.9–3.5)
GLUCOSE SERPL-MCNC: 105 MG/DL (ref 65–99)
HCT VFR BLD AUTO: 29.2 % (ref 37–47)
HGB BLD-MCNC: 9.9 G/DL (ref 12–16)
MCH RBC QN AUTO: 31.6 PG (ref 27–33)
MCHC RBC AUTO-ENTMCNC: 33.9 G/DL (ref 33.6–35)
MCV RBC AUTO: 93.3 FL (ref 81.4–97.8)
PLATELET # BLD AUTO: 271 K/UL (ref 164–446)
PMV BLD AUTO: 9.8 FL (ref 9–12.9)
POTASSIUM SERPL-SCNC: 3.3 MMOL/L (ref 3.6–5.5)
PROT SERPL-MCNC: 5.1 G/DL (ref 6–8.2)
RBC # BLD AUTO: 3.13 M/UL (ref 4.2–5.4)
SIGNIFICANT IND 70042: NORMAL
SITE SITE: NORMAL
SODIUM SERPL-SCNC: 141 MMOL/L (ref 135–145)
SOURCE SOURCE: NORMAL
WBC # BLD AUTO: 5.8 K/UL (ref 4.8–10.8)

## 2020-04-08 PROCEDURE — 770001 HCHG ROOM/CARE - MED/SURG/GYN PRIV*

## 2020-04-08 PROCEDURE — 700102 HCHG RX REV CODE 250 W/ 637 OVERRIDE(OP): Performed by: SURGERY

## 2020-04-08 PROCEDURE — 700111 HCHG RX REV CODE 636 W/ 250 OVERRIDE (IP): Performed by: SURGERY

## 2020-04-08 PROCEDURE — 700101 HCHG RX REV CODE 250: Performed by: SURGERY

## 2020-04-08 PROCEDURE — 700105 HCHG RX REV CODE 258: Performed by: SURGERY

## 2020-04-08 PROCEDURE — 700111 HCHG RX REV CODE 636 W/ 250 OVERRIDE (IP): Performed by: ANESTHESIOLOGY

## 2020-04-08 PROCEDURE — 80053 COMPREHEN METABOLIC PANEL: CPT

## 2020-04-08 PROCEDURE — 85027 COMPLETE CBC AUTOMATED: CPT

## 2020-04-08 PROCEDURE — A9270 NON-COVERED ITEM OR SERVICE: HCPCS | Performed by: SURGERY

## 2020-04-08 PROCEDURE — 36415 COLL VENOUS BLD VENIPUNCTURE: CPT

## 2020-04-08 RX ORDER — SODIUM CHLORIDE AND POTASSIUM CHLORIDE 300; 900 MG/100ML; MG/100ML
INJECTION, SOLUTION INTRAVENOUS CONTINUOUS
Status: DISCONTINUED | OUTPATIENT
Start: 2020-04-08 | End: 2020-04-11

## 2020-04-08 RX ORDER — LABETALOL HYDROCHLORIDE 5 MG/ML
10 INJECTION, SOLUTION INTRAVENOUS EVERY 4 HOURS PRN
Status: DISCONTINUED | OUTPATIENT
Start: 2020-04-08 | End: 2020-04-11 | Stop reason: HOSPADM

## 2020-04-08 RX ORDER — METOCLOPRAMIDE HYDROCHLORIDE 5 MG/ML
10 INJECTION INTRAMUSCULAR; INTRAVENOUS EVERY 6 HOURS
Status: DISCONTINUED | OUTPATIENT
Start: 2020-04-08 | End: 2020-04-10

## 2020-04-08 RX ADMIN — POTASSIUM CHLORIDE AND SODIUM CHLORIDE: 900; 300 INJECTION, SOLUTION INTRAVENOUS at 10:03

## 2020-04-08 RX ADMIN — ONDANSETRON 4 MG: 2 INJECTION INTRAMUSCULAR; INTRAVENOUS at 21:14

## 2020-04-08 RX ADMIN — ERYTHROMYCIN LACTOBIONATE 250 MG: 500 INJECTION, POWDER, LYOPHILIZED, FOR SOLUTION INTRAVENOUS at 10:03

## 2020-04-08 RX ADMIN — METOCLOPRAMIDE 10 MG: 5 INJECTION, SOLUTION INTRAMUSCULAR; INTRAVENOUS at 18:20

## 2020-04-08 RX ADMIN — METOCLOPRAMIDE 10 MG: 5 INJECTION, SOLUTION INTRAMUSCULAR; INTRAVENOUS at 23:04

## 2020-04-08 RX ADMIN — POTASSIUM BICARBONATE 25 MEQ: 978 TABLET, EFFERVESCENT ORAL at 07:35

## 2020-04-08 RX ADMIN — ONDANSETRON 4 MG: 2 INJECTION INTRAMUSCULAR; INTRAVENOUS at 06:32

## 2020-04-08 RX ADMIN — ONDANSETRON 4 MG: 2 INJECTION INTRAMUSCULAR; INTRAVENOUS at 09:58

## 2020-04-08 RX ADMIN — METOCLOPRAMIDE 10 MG: 5 INJECTION, SOLUTION INTRAMUSCULAR; INTRAVENOUS at 13:19

## 2020-04-08 RX ADMIN — ERYTHROMYCIN LACTOBIONATE 250 MG: 500 INJECTION, POWDER, LYOPHILIZED, FOR SOLUTION INTRAVENOUS at 21:14

## 2020-04-08 RX ADMIN — ENOXAPARIN SODIUM 40 MG: 100 INJECTION SUBCUTANEOUS at 07:35

## 2020-04-08 RX ADMIN — ERYTHROMYCIN LACTOBIONATE 250 MG: 500 INJECTION, POWDER, LYOPHILIZED, FOR SOLUTION INTRAVENOUS at 15:54

## 2020-04-08 RX ADMIN — POTASSIUM CHLORIDE AND SODIUM CHLORIDE: 900; 300 INJECTION, SOLUTION INTRAVENOUS at 23:04

## 2020-04-08 ASSESSMENT — ENCOUNTER SYMPTOMS
NAUSEA: 1
VOMITING: 1

## 2020-04-08 NOTE — PROGRESS NOTES
Assumed care at 1845. Pt resting in bed. A&ox 4  Ambulating independently  Tolerated dinner with some nausea. Vomited tonight x1 300 ml green output  +flatus. No bm yet  abd prevena wound vac in place  Old red  drain site leaking. Dressing changed  Medicated with zofran  O2 on RA  Pain controlled   Voiding adequately  Call light with reach. Hourly rounding in place.

## 2020-04-08 NOTE — CARE PLAN
Problem: Safety  Goal: Will remain free from injury  Outcome: PROGRESSING AS EXPECTED  Goal: Will remain free from falls  Outcome: PROGRESSING AS EXPECTED   Updated about POC. Reinforce call light use. Pt acknowledged understanding    Problem: Bowel/Gastric:  Goal: Normal bowel function is maintained or improved  Outcome: PROGRESSING AS EXPECTED  Goal: Will not experience complications related to bowel motility  Outcome: PROGRESSING AS EXPECTED  Some nausea. Vomited. Medicated with zofran     Problem: Mobility  Goal: Risk for activity intolerance will decrease  Outcome: PROGRESSING AS EXPECTED  Encouraged ambulation.

## 2020-04-08 NOTE — PROGRESS NOTES
Assumed care of patient. Patient denying pain and stating nausea has subsided. Patient nervous to drink the K-Lyte, but is taking it slow and still denying nausea. S/p drain #2 removal dressing is saturated. Dressing replaced. Midline incision with intact prevena. Abdomen is soft. Patient is ambulating. No other needs at this time. Call light within reach.

## 2020-04-08 NOTE — CARE PLAN
Problem: Pain Management  Goal: Pain level will decrease to patient's comfort goal  Outcome: PROGRESSING AS EXPECTED  Patient stating pain is tolerable. Encouraged patient to notify RN with onset of pain.    Problem: Mobility  Goal: Risk for activity intolerance will decrease  Outcome: PROGRESSING AS EXPECTED  Patient ambulating frequently up self.

## 2020-04-08 NOTE — PROGRESS NOTES
Surgical Progress Note    Author: Corwin Perez M.D. Date & Time created: 2020   10:04 AM     Interval Events:  POD 4  Did not tolerate general diet- suffered NV overnight and this AM  Pain managed well  Drains out  K low again  We spoke about the path results and need for adjuvant chemorads    Review of Systems   Constitutional: Positive for malaise/fatigue.   Gastrointestinal: Positive for nausea and vomiting.   All other systems reviewed and are negative.    Hemodynamics:  Temp (24hrs), Av.9 °C (98.4 °F), Min:36.7 °C (98 °F), Max:37.1 °C (98.8 °F)  Temperature: 36.7 °C (98 °F)  Pulse  Av.6  Min: 65  Max: 99   Blood Pressure : 137/83     Respiratory:    Respiration: 16, Pulse Oximetry: 95 %        RUL Breath Sounds: Clear, RML Breath Sounds: Clear, RLL Breath Sounds: Diminished, CEASAR Breath Sounds: Clear, LLL Breath Sounds: Diminished  Neuro:  GCS       Fluids:    Intake/Output Summary (Last 24 hours) at 2020 1004  Last data filed at 2020 0600  Gross per 24 hour   Intake 480 ml   Output 1950 ml   Net -1470 ml        Current Diet Order   Procedures   • Diet NPO     Physical Exam  Vitals signs and nursing note reviewed.   Cardiovascular:      Rate and Rhythm: Normal rate and regular rhythm.   Pulmonary:      Effort: Pulmonary effort is normal.      Breath sounds: Normal breath sounds.   Abdominal:      General: Abdomen is flat. Bowel sounds are normal.      Palpations: Abdomen is soft.      Comments: DULCE MARIA sights leaking  Prevena ON       Labs:  Recent Results (from the past 24 hour(s))   CBC without Differential    Collection Time: 20  3:44 AM   Result Value Ref Range    WBC 5.8 4.8 - 10.8 K/uL    RBC 3.13 (L) 4.20 - 5.40 M/uL    Hemoglobin 9.9 (L) 12.0 - 16.0 g/dL    Hematocrit 29.2 (L) 37.0 - 47.0 %    MCV 93.3 81.4 - 97.8 fL    MCH 31.6 27.0 - 33.0 pg    MCHC 33.9 33.6 - 35.0 g/dL    RDW 44.9 35.9 - 50.0 fL    Platelet Count 271 164 - 446 K/uL    MPV 9.8 9.0 - 12.9 fL   Comp  Metabolic Panel (CMP)    Collection Time: 04/08/20  3:44 AM   Result Value Ref Range    Sodium 141 135 - 145 mmol/L    Potassium 3.3 (L) 3.6 - 5.5 mmol/L    Chloride 109 96 - 112 mmol/L    Co2 22 20 - 33 mmol/L    Anion Gap 10.0 7.0 - 16.0    Glucose 105 (H) 65 - 99 mg/dL    Bun 6 (L) 8 - 22 mg/dL    Creatinine 0.51 0.50 - 1.40 mg/dL    Calcium 8.0 (L) 8.5 - 10.5 mg/dL    AST(SGOT) 21 12 - 45 U/L    ALT(SGPT) 39 2 - 50 U/L    Alkaline Phosphatase 53 30 - 99 U/L    Total Bilirubin 0.4 0.1 - 1.5 mg/dL    Albumin 2.6 (L) 3.2 - 4.9 g/dL    Total Protein 5.1 (L) 6.0 - 8.2 g/dL    Globulin 2.5 1.9 - 3.5 g/dL    A-G Ratio 1.0 g/dL   ESTIMATED GFR    Collection Time: 04/08/20  3:44 AM   Result Value Ref Range    GFR If African American >60 >60 mL/min/1.73 m 2    GFR If Non African American >60 >60 mL/min/1.73 m 2     Medical Decision Making, by Problem:  Active Hospital Problems    Diagnosis   • 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 lisinopril/hydrochlorothiazide  P.M. lisinopril  PRN medication ordered     • Heart valve disease [I38]     Priority: Medium     mitral valve insufficiency  On Lopressor  Restart with PO once NGT out       • High cholesterol [E78.00]     Priority: Medium     Premorbid on statin  Restart when taking PO     • Acute blood loss anemia [D62]     Transfuse 1 unit PRBC's for hemoglobin less than 7.       Plan:  Npo except meds  Start reglan and erythromycin  IV hydration  Replace K    Quality Measures:  Quality-Core Measures    Discussed patient condition with Family and RN

## 2020-04-09 LAB
ALBUMIN SERPL BCP-MCNC: 2.8 G/DL (ref 3.2–4.9)
ALBUMIN/GLOB SERPL: 1.1 G/DL
ALP SERPL-CCNC: 59 U/L (ref 30–99)
ALT SERPL-CCNC: 41 U/L (ref 2–50)
ANION GAP SERPL CALC-SCNC: 18 MMOL/L (ref 7–16)
AST SERPL-CCNC: 47 U/L (ref 12–45)
BASOPHILS # BLD AUTO: 0.4 % (ref 0–1.8)
BASOPHILS # BLD: 0.03 K/UL (ref 0–0.12)
BILIRUB SERPL-MCNC: 0.4 MG/DL (ref 0.1–1.5)
BUN SERPL-MCNC: 6 MG/DL (ref 8–22)
CALCIUM SERPL-MCNC: 8 MG/DL (ref 8.5–10.5)
CHLORIDE SERPL-SCNC: 109 MMOL/L (ref 96–112)
CO2 SERPL-SCNC: 14 MMOL/L (ref 20–33)
CREAT SERPL-MCNC: 0.48 MG/DL (ref 0.5–1.4)
EOSINOPHIL # BLD AUTO: 0.05 K/UL (ref 0–0.51)
EOSINOPHIL NFR BLD: 0.6 % (ref 0–6.9)
ERYTHROCYTE [DISTWIDTH] IN BLOOD BY AUTOMATED COUNT: 43.8 FL (ref 35.9–50)
GLOBULIN SER CALC-MCNC: 2.5 G/DL (ref 1.9–3.5)
GLUCOSE SERPL-MCNC: 82 MG/DL (ref 65–99)
HCT VFR BLD AUTO: 31.4 % (ref 37–47)
HGB BLD-MCNC: 11 G/DL (ref 12–16)
IMM GRANULOCYTES # BLD AUTO: 0.06 K/UL (ref 0–0.11)
IMM GRANULOCYTES NFR BLD AUTO: 0.7 % (ref 0–0.9)
LYMPHOCYTES # BLD AUTO: 0.69 K/UL (ref 1–4.8)
LYMPHOCYTES NFR BLD: 8.5 % (ref 22–41)
MCH RBC QN AUTO: 32.2 PG (ref 27–33)
MCHC RBC AUTO-ENTMCNC: 35 G/DL (ref 33.6–35)
MCV RBC AUTO: 91.8 FL (ref 81.4–97.8)
MONOCYTES # BLD AUTO: 0.6 K/UL (ref 0–0.85)
MONOCYTES NFR BLD AUTO: 7.4 % (ref 0–13.4)
NEUTROPHILS # BLD AUTO: 6.73 K/UL (ref 2–7.15)
NEUTROPHILS NFR BLD: 82.4 % (ref 44–72)
NRBC # BLD AUTO: 0 K/UL
NRBC BLD-RTO: 0 /100 WBC
PLATELET # BLD AUTO: 256 K/UL (ref 164–446)
PMV BLD AUTO: 10.9 FL (ref 9–12.9)
POTASSIUM SERPL-SCNC: 4.5 MMOL/L (ref 3.6–5.5)
PROT SERPL-MCNC: 5.3 G/DL (ref 6–8.2)
RBC # BLD AUTO: 3.42 M/UL (ref 4.2–5.4)
SODIUM SERPL-SCNC: 141 MMOL/L (ref 135–145)
WBC # BLD AUTO: 8.2 K/UL (ref 4.8–10.8)

## 2020-04-09 PROCEDURE — 700111 HCHG RX REV CODE 636 W/ 250 OVERRIDE (IP): Performed by: SURGERY

## 2020-04-09 PROCEDURE — 770001 HCHG ROOM/CARE - MED/SURG/GYN PRIV*

## 2020-04-09 PROCEDURE — A9270 NON-COVERED ITEM OR SERVICE: HCPCS | Performed by: SURGERY

## 2020-04-09 PROCEDURE — 80053 COMPREHEN METABOLIC PANEL: CPT

## 2020-04-09 PROCEDURE — 36415 COLL VENOUS BLD VENIPUNCTURE: CPT

## 2020-04-09 PROCEDURE — 85025 COMPLETE CBC W/AUTO DIFF WBC: CPT

## 2020-04-09 PROCEDURE — 700101 HCHG RX REV CODE 250: Performed by: SURGERY

## 2020-04-09 PROCEDURE — 700105 HCHG RX REV CODE 258: Performed by: SURGERY

## 2020-04-09 PROCEDURE — 700102 HCHG RX REV CODE 250 W/ 637 OVERRIDE(OP): Performed by: SURGERY

## 2020-04-09 RX ORDER — SPIRONOLACTONE 50 MG/1
25 TABLET, FILM COATED ORAL
Status: COMPLETED | OUTPATIENT
Start: 2020-04-09 | End: 2020-04-11

## 2020-04-09 RX ADMIN — ERYTHROMYCIN LACTOBIONATE 250 MG: 500 INJECTION, POWDER, LYOPHILIZED, FOR SOLUTION INTRAVENOUS at 05:28

## 2020-04-09 RX ADMIN — CELECOXIB 200 MG: 200 CAPSULE ORAL at 05:29

## 2020-04-09 RX ADMIN — METOCLOPRAMIDE 10 MG: 5 INJECTION, SOLUTION INTRAMUSCULAR; INTRAVENOUS at 23:14

## 2020-04-09 RX ADMIN — ERYTHROMYCIN LACTOBIONATE 250 MG: 500 INJECTION, POWDER, LYOPHILIZED, FOR SOLUTION INTRAVENOUS at 13:30

## 2020-04-09 RX ADMIN — METOCLOPRAMIDE 10 MG: 5 INJECTION, SOLUTION INTRAMUSCULAR; INTRAVENOUS at 13:24

## 2020-04-09 RX ADMIN — METOCLOPRAMIDE 10 MG: 5 INJECTION, SOLUTION INTRAMUSCULAR; INTRAVENOUS at 18:08

## 2020-04-09 RX ADMIN — POTASSIUM BICARBONATE 25 MEQ: 978 TABLET, EFFERVESCENT ORAL at 05:28

## 2020-04-09 RX ADMIN — SPIRONOLACTONE 25 MG: 50 TABLET ORAL at 07:49

## 2020-04-09 RX ADMIN — POTASSIUM CHLORIDE AND SODIUM CHLORIDE: 900; 300 INJECTION, SOLUTION INTRAVENOUS at 18:09

## 2020-04-09 RX ADMIN — METOCLOPRAMIDE 10 MG: 5 INJECTION, SOLUTION INTRAMUSCULAR; INTRAVENOUS at 05:28

## 2020-04-09 RX ADMIN — ENOXAPARIN SODIUM 40 MG: 100 INJECTION SUBCUTANEOUS at 07:41

## 2020-04-09 RX ADMIN — ANTACID TABLETS 500 MG: 500 TABLET, CHEWABLE ORAL at 13:24

## 2020-04-09 RX ADMIN — POTASSIUM CHLORIDE AND SODIUM CHLORIDE: 900; 300 INJECTION, SOLUTION INTRAVENOUS at 07:40

## 2020-04-09 RX ADMIN — OMEPRAZOLE 20 MG: 20 CAPSULE, DELAYED RELEASE ORAL at 05:28

## 2020-04-09 RX ADMIN — ANTACID TABLETS 500 MG: 500 TABLET, CHEWABLE ORAL at 05:28

## 2020-04-09 RX ADMIN — ERYTHROMYCIN LACTOBIONATE 250 MG: 500 INJECTION, POWDER, LYOPHILIZED, FOR SOLUTION INTRAVENOUS at 23:14

## 2020-04-09 RX ADMIN — ANTACID TABLETS 500 MG: 500 TABLET, CHEWABLE ORAL at 18:08

## 2020-04-09 NOTE — PROGRESS NOTES
Surgical Progress Note    Author: Corwin Perez M.D. Date & Time created: 2020   8:14 AM     Interval Events:  POD 5  Feels better  No NV x 24 hrs  Feels K po may have caused the NV and dry heaves since it all revolves around the po dose  Minimal pain  Review of Systems   All other systems reviewed and are negative.    Hemodynamics:  Temp (24hrs), Av.8 °C (98.2 °F), Min:36.6 °C (97.9 °F), Max:37.1 °C (98.8 °F)  Temperature: 36.6 °C (97.9 °F)  Pulse  Av.9  Min: 65  Max: 99   Blood Pressure : 142/83     Respiratory:    Respiration: 16, Pulse Oximetry: 95 %     Work Of Breathing / Effort: Mild  RUL Breath Sounds: Clear, RML Breath Sounds: Clear, RLL Breath Sounds: Clear, CEASAR Breath Sounds: Clear, LLL Breath Sounds: Clear  Neuro:  GCS Total Fisher Coma Score: 15     Fluids:    Intake/Output Summary (Last 24 hours) at 2020 0814  Last data filed at 2020 0800  Gross per 24 hour   Intake 420 ml   Output 450 ml   Net -30 ml        Current Diet Order   Procedures   • Diet NPO     Physical Exam  Vitals signs and nursing note reviewed.   Cardiovascular:      Rate and Rhythm: Normal rate and regular rhythm.   Pulmonary:      Effort: Pulmonary effort is normal.      Breath sounds: Normal breath sounds.   Abdominal:      General: Abdomen is flat. Bowel sounds are normal.      Palpations: Abdomen is soft.      Comments: No NV       Labs:  No results found for this or any previous visit (from the past 24 hour(s)).  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 lisinopril/hydrochlorothiazide  P.M. lisinopril  PRN medication ordered     • Heart valve disease [I38]      Priority: Medium     mitral valve insufficiency  On Lopressor  Restart with PO once NGT out       • High cholesterol [E78.00]     Priority: Medium     Premorbid on statin  Restart when taking PO     • Acute blood loss anemia [D62]     Transfuse 1 unit PRBC's for hemoglobin less than 7.       Plan:  Clears liquid diet  Ambulate  DC K po -may be source of NV  Discussed path with Dr. Grace from Rad onc    Quality Measures:  Quality-Core Measures    Discussed patient condition with Family and RN

## 2020-04-09 NOTE — CARE PLAN
Problem: Bowel/Gastric:  Goal: Normal bowel function is maintained or improved  Outcome: PROGRESSING AS EXPECTED   +BM. Bowel sounds hypoactive. Denies n/v. Will discuss with MD about possibly advancing diet to clears. +flatus. Encouraging ambulation.     Problem: Pain Management  Goal: Pain level will decrease to patient's comfort goal  Outcome: PROGRESSING AS EXPECTED   Pain well controlled. Ice/heat packs offered. Extra pillows in use for comfort. Pt educated on PRN pain medications

## 2020-04-09 NOTE — PROGRESS NOTES
Pt A&O x 4.   Declines pain at this time  Abdominal midline incision with prevena in place, day #6.  Old DULCE MARIA sites x 2 to RLQ. +moderate serous output noted. Dressing changed with dry gauze, abd pad, and transparent.  Pt denies n/t at this time.  Pt NPO sips with meds. Denies n/v. Hypoactive bowel sounds upon auscultation. +flatus. +BM this A.M 4/9/20. BM formed/green per pt.  Lab at bedside.  POC discussed with pt. All needs addressed at this time.

## 2020-04-10 LAB
ALBUMIN SERPL BCP-MCNC: 3 G/DL (ref 3.2–4.9)
ALBUMIN/GLOB SERPL: 1.3 G/DL
ALP SERPL-CCNC: 60 U/L (ref 30–99)
ALT SERPL-CCNC: 48 U/L (ref 2–50)
ANION GAP SERPL CALC-SCNC: 17 MMOL/L (ref 7–16)
AST SERPL-CCNC: 45 U/L (ref 12–45)
BASOPHILS # BLD AUTO: 0.6 % (ref 0–1.8)
BASOPHILS # BLD: 0.05 K/UL (ref 0–0.12)
BILIRUB SERPL-MCNC: 0.4 MG/DL (ref 0.1–1.5)
BUN SERPL-MCNC: 4 MG/DL (ref 8–22)
CALCIUM SERPL-MCNC: 8.3 MG/DL (ref 8.5–10.5)
CHLORIDE SERPL-SCNC: 104 MMOL/L (ref 96–112)
CO2 SERPL-SCNC: 14 MMOL/L (ref 20–33)
CREAT SERPL-MCNC: 0.45 MG/DL (ref 0.5–1.4)
EOSINOPHIL # BLD AUTO: 0.08 K/UL (ref 0–0.51)
EOSINOPHIL NFR BLD: 0.9 % (ref 0–6.9)
ERYTHROCYTE [DISTWIDTH] IN BLOOD BY AUTOMATED COUNT: 44.6 FL (ref 35.9–50)
GLOBULIN SER CALC-MCNC: 2.4 G/DL (ref 1.9–3.5)
GLUCOSE SERPL-MCNC: 91 MG/DL (ref 65–99)
HCT VFR BLD AUTO: 29.8 % (ref 37–47)
HGB BLD-MCNC: 10.2 G/DL (ref 12–16)
IMM GRANULOCYTES # BLD AUTO: 0.06 K/UL (ref 0–0.11)
IMM GRANULOCYTES NFR BLD AUTO: 0.7 % (ref 0–0.9)
LYMPHOCYTES # BLD AUTO: 0.76 K/UL (ref 1–4.8)
LYMPHOCYTES NFR BLD: 8.9 % (ref 22–41)
MCH RBC QN AUTO: 31.9 PG (ref 27–33)
MCHC RBC AUTO-ENTMCNC: 34.2 G/DL (ref 33.6–35)
MCV RBC AUTO: 93.1 FL (ref 81.4–97.8)
MONOCYTES # BLD AUTO: 0.61 K/UL (ref 0–0.85)
MONOCYTES NFR BLD AUTO: 7.2 % (ref 0–13.4)
NEUTROPHILS # BLD AUTO: 6.95 K/UL (ref 2–7.15)
NEUTROPHILS NFR BLD: 81.7 % (ref 44–72)
NRBC # BLD AUTO: 0 K/UL
NRBC BLD-RTO: 0 /100 WBC
PLATELET # BLD AUTO: 296 K/UL (ref 164–446)
PMV BLD AUTO: 9.7 FL (ref 9–12.9)
POTASSIUM SERPL-SCNC: 4.2 MMOL/L (ref 3.6–5.5)
PROT SERPL-MCNC: 5.4 G/DL (ref 6–8.2)
RBC # BLD AUTO: 3.2 M/UL (ref 4.2–5.4)
SODIUM SERPL-SCNC: 135 MMOL/L (ref 135–145)
WBC # BLD AUTO: 8.5 K/UL (ref 4.8–10.8)

## 2020-04-10 PROCEDURE — 700101 HCHG RX REV CODE 250: Performed by: SURGERY

## 2020-04-10 PROCEDURE — 80053 COMPREHEN METABOLIC PANEL: CPT

## 2020-04-10 PROCEDURE — 36415 COLL VENOUS BLD VENIPUNCTURE: CPT

## 2020-04-10 PROCEDURE — 700105 HCHG RX REV CODE 258: Performed by: SURGERY

## 2020-04-10 PROCEDURE — 700111 HCHG RX REV CODE 636 W/ 250 OVERRIDE (IP): Performed by: SURGERY

## 2020-04-10 PROCEDURE — 700102 HCHG RX REV CODE 250 W/ 637 OVERRIDE(OP): Performed by: SURGERY

## 2020-04-10 PROCEDURE — 770001 HCHG ROOM/CARE - MED/SURG/GYN PRIV*

## 2020-04-10 PROCEDURE — A9270 NON-COVERED ITEM OR SERVICE: HCPCS | Performed by: SURGERY

## 2020-04-10 PROCEDURE — 85025 COMPLETE CBC W/AUTO DIFF WBC: CPT

## 2020-04-10 RX ORDER — METOCLOPRAMIDE 10 MG/1
10 TABLET ORAL EVERY 6 HOURS
Status: DISCONTINUED | OUTPATIENT
Start: 2020-04-10 | End: 2020-04-11 | Stop reason: HOSPADM

## 2020-04-10 RX ADMIN — ENOXAPARIN SODIUM 40 MG: 100 INJECTION SUBCUTANEOUS at 09:09

## 2020-04-10 RX ADMIN — ANTACID TABLETS 500 MG: 500 TABLET, CHEWABLE ORAL at 06:08

## 2020-04-10 RX ADMIN — METOCLOPRAMIDE 10 MG: 10 TABLET ORAL at 18:22

## 2020-04-10 RX ADMIN — POTASSIUM CHLORIDE AND SODIUM CHLORIDE: 900; 300 INJECTION, SOLUTION INTRAVENOUS at 01:45

## 2020-04-10 RX ADMIN — METOCLOPRAMIDE 10 MG: 10 TABLET ORAL at 23:04

## 2020-04-10 RX ADMIN — SPIRONOLACTONE 25 MG: 50 TABLET ORAL at 06:11

## 2020-04-10 RX ADMIN — ANTACID TABLETS 500 MG: 500 TABLET, CHEWABLE ORAL at 18:22

## 2020-04-10 RX ADMIN — ANTACID TABLETS 500 MG: 500 TABLET, CHEWABLE ORAL at 13:07

## 2020-04-10 RX ADMIN — ERYTHROMYCIN LACTOBIONATE 250 MG: 500 INJECTION, POWDER, LYOPHILIZED, FOR SOLUTION INTRAVENOUS at 23:04

## 2020-04-10 RX ADMIN — OMEPRAZOLE 20 MG: 20 CAPSULE, DELAYED RELEASE ORAL at 06:08

## 2020-04-10 RX ADMIN — METOCLOPRAMIDE 10 MG: 10 TABLET ORAL at 13:07

## 2020-04-10 RX ADMIN — POTASSIUM CHLORIDE AND SODIUM CHLORIDE: 900; 300 INJECTION, SOLUTION INTRAVENOUS at 16:47

## 2020-04-10 RX ADMIN — ERYTHROMYCIN LACTOBIONATE 250 MG: 500 INJECTION, POWDER, LYOPHILIZED, FOR SOLUTION INTRAVENOUS at 06:08

## 2020-04-10 RX ADMIN — CELECOXIB 200 MG: 200 CAPSULE ORAL at 06:08

## 2020-04-10 RX ADMIN — ERYTHROMYCIN LACTOBIONATE 250 MG: 500 INJECTION, POWDER, LYOPHILIZED, FOR SOLUTION INTRAVENOUS at 14:07

## 2020-04-10 RX ADMIN — METOCLOPRAMIDE 10 MG: 5 INJECTION, SOLUTION INTRAMUSCULAR; INTRAVENOUS at 06:09

## 2020-04-10 NOTE — PROGRESS NOTES
"Pt AA&O x4. No c/o pain, n/v, n/t or SOB. Pt on RA. Tolerating clear liquid diet. Diet changed to full liquid per MD. Abdominal midline incision with prevena and intact. DULCE MARIA sites with ostomy appliance over it and small serous output. Hypoactive BS. +flatus, +BM this am 4/10. Pt ambulates in hallway without assistance. Plan of care discussed. Hourly rounding in place. Call light within reach. /80   Pulse 85   Temp 36.5 °C (97.7 °F) (Temporal)   Resp 15   Ht 1.626 m (5' 4\")   Wt 71.8 kg (158 lb 4.6 oz)   SpO2 94%     "

## 2020-04-10 NOTE — CARE PLAN
Problem: Communication  Goal: The ability to communicate needs accurately and effectively will improve  Outcome: PROGRESSING AS EXPECTED     Problem: Safety  Goal: Will remain free from injury  Outcome: PROGRESSING AS EXPECTED  Goal: Will remain free from falls  Outcome: PROGRESSING AS EXPECTED     Problem: Infection  Goal: Will remain free from infection  Outcome: PROGRESSING AS EXPECTED     Problem: Bowel/Gastric:  Goal: Normal bowel function is maintained or improved  Outcome: PROGRESSING AS EXPECTED  Goal: Will not experience complications related to bowel motility  Outcome: PROGRESSING AS EXPECTED

## 2020-04-10 NOTE — CARE PLAN
Problem: Safety  Goal: Will remain free from injury  Outcome: PROGRESSING AS EXPECTED  Pt ambulates with steady gait independently     Problem: Venous Thromboembolism (VTW)/Deep Vein Thrombosis (DVT) Prevention:  Goal: Patient will participate in Venous Thrombosis (VTE)/Deep Vein Thrombosis (DVT)Prevention Measures  Outcome: PROGRESSING AS EXPECTED  SCDs in place.

## 2020-04-10 NOTE — PROGRESS NOTES
Surgical Progress Note    Author: Corwin Perez M.D. Date & Time created: 4/10/2020   7:37 AM     Interval Events:  POD 6  Did well with clears- No NV x 48 hrs  Labs ok  Comfortable  Feels better than yesterday    Review of Systems   All other systems reviewed and are negative.    Hemodynamics:  Temp (24hrs), Av.8 °C (98.3 °F), Min:36.7 °C (98.1 °F), Max:36.9 °C (98.5 °F)  Temperature: 36.9 °C (98.5 °F)  Pulse  Av.4  Min: 60  Max: 99   Blood Pressure : 142/88     Respiratory:    Respiration: 17, Pulse Oximetry: 95 %        RUL Breath Sounds: Clear, RML Breath Sounds: Clear, RLL Breath Sounds: Clear, CEASAR Breath Sounds: Clear, LLL Breath Sounds: Clear  Neuro:  GCS       Fluids:    Intake/Output Summary (Last 24 hours) at 4/10/2020 0737  Last data filed at 4/10/2020 0405  Gross per 24 hour   Intake 2510 ml   Output 1425 ml   Net 1085 ml        Current Diet Order   Procedures   • Diet Order Diabetic, Full Liquid     Physical Exam  Cardiovascular:      Rate and Rhythm: Normal rate and regular rhythm.   Pulmonary:      Effort: Pulmonary effort is normal.      Breath sounds: Normal breath sounds.   Abdominal:      General: Abdomen is flat. Bowel sounds are normal.      Palpations: Abdomen is soft.      Comments: Minimal pain  prevena on       Labs:  Recent Results (from the past 24 hour(s))   Comp Metabolic Panel    Collection Time: 04/10/20 12:49 AM   Result Value Ref Range    Sodium 135 135 - 145 mmol/L    Potassium 4.2 3.6 - 5.5 mmol/L    Chloride 104 96 - 112 mmol/L    Co2 14 (L) 20 - 33 mmol/L    Anion Gap 17.0 (H) 7.0 - 16.0    Glucose 91 65 - 99 mg/dL    Bun 4 (L) 8 - 22 mg/dL    Creatinine 0.45 (L) 0.50 - 1.40 mg/dL    Calcium 8.3 (L) 8.5 - 10.5 mg/dL    AST(SGOT) 45 12 - 45 U/L    ALT(SGPT) 48 2 - 50 U/L    Alkaline Phosphatase 60 30 - 99 U/L    Total Bilirubin 0.4 0.1 - 1.5 mg/dL    Albumin 3.0 (L) 3.2 - 4.9 g/dL    Total Protein 5.4 (L) 6.0 - 8.2 g/dL    Globulin 2.4 1.9 - 3.5 g/dL    A-G  Ratio 1.3 g/dL   CBC WITH DIFFERENTIAL    Collection Time: 04/10/20 12:49 AM   Result Value Ref Range    WBC 8.5 4.8 - 10.8 K/uL    RBC 3.20 (L) 4.20 - 5.40 M/uL    Hemoglobin 10.2 (L) 12.0 - 16.0 g/dL    Hematocrit 29.8 (L) 37.0 - 47.0 %    MCV 93.1 81.4 - 97.8 fL    MCH 31.9 27.0 - 33.0 pg    MCHC 34.2 33.6 - 35.0 g/dL    RDW 44.6 35.9 - 50.0 fL    Platelet Count 296 164 - 446 K/uL    MPV 9.7 9.0 - 12.9 fL    Neutrophils-Polys 81.70 (H) 44.00 - 72.00 %    Lymphocytes 8.90 (L) 22.00 - 41.00 %    Monocytes 7.20 0.00 - 13.40 %    Eosinophils 0.90 0.00 - 6.90 %    Basophils 0.60 0.00 - 1.80 %    Immature Granulocytes 0.70 0.00 - 0.90 %    Nucleated RBC 0.00 /100 WBC    Neutrophils (Absolute) 6.95 2.00 - 7.15 K/uL    Lymphs (Absolute) 0.76 (L) 1.00 - 4.80 K/uL    Monos (Absolute) 0.61 0.00 - 0.85 K/uL    Eos (Absolute) 0.08 0.00 - 0.51 K/uL    Baso (Absolute) 0.05 0.00 - 0.12 K/uL    Immature Granulocytes (abs) 0.06 0.00 - 0.11 K/uL    NRBC (Absolute) 0.00 K/uL   ESTIMATED GFR    Collection Time: 04/10/20 12:49 AM   Result Value Ref Range    GFR If African American >60 >60 mL/min/1.73 m 2    GFR If Non African American >60 >60 mL/min/1.73 m 2     Medical Decision Making, by Problem:  Active Hospital Problems    Diagnosis   • 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 lisinopril/hydrochlorothiazide  P.M. lisinopril  PRN medication ordered     • Heart valve disease [I38]     Priority: Medium     mitral valve insufficiency  On Lopressor  Restart with PO once NGT out       • High cholesterol [E78.00]     Priority: Medium     Premorbid on statin  Restart when taking PO     • Acute blood loss anemia [D62]     Transfuse 1 unit PRBC's for hemoglobin less than 7.        Plan:  DC prevena  SHower ok  Full liquids  ambulate    Quality Measures:  Quality-Core Measures    Discussed patient condition with Family and RN

## 2020-04-10 NOTE — PROGRESS NOTES
Report received. Assessment complete.  AAOx4  Pt denies pain. No interventions needed at this time.  Pt denies N/V, SOB, or chest pain. Pt tolerating clears, no reds diet.   Abdominal MLI with prevena in place.   Previous DULCE MARIA sites x2 to RLQ. DULCE MARIA site with pediatric ostomy bag. Moderate serous output noted. Gauze dressing CDI.  +flatus, +void, pt reported BM 04/09/20.   REBOLLEDO, pt self-ambulatory.  20G LFA PIV dressing reinforced.     Plan of care discussed with patient. Fall precautions in place. Belongings and call light within reach. Educated patient to call for assistance as needed. All needs met at this time.

## 2020-04-11 VITALS
WEIGHT: 158.29 LBS | SYSTOLIC BLOOD PRESSURE: 150 MMHG | TEMPERATURE: 97.8 F | HEART RATE: 93 BPM | BODY MASS INDEX: 27.02 KG/M2 | RESPIRATION RATE: 18 BRPM | DIASTOLIC BLOOD PRESSURE: 92 MMHG | OXYGEN SATURATION: 95 % | HEIGHT: 64 IN

## 2020-04-11 PROBLEM — C22.1 CHOLANGIOCARCINOMA OF BILIARY TRACT (HCC): Status: ACTIVE | Noted: 2020-04-11

## 2020-04-11 PROBLEM — D37.8 NEOPLASM OF UNCERTAIN BEHAVIOR OF HEAD OF PANCREAS: Status: RESOLVED | Noted: 2020-04-03 | Resolved: 2020-04-11

## 2020-04-11 PROBLEM — D62 ACUTE BLOOD LOSS ANEMIA: Status: RESOLVED | Noted: 2020-04-04 | Resolved: 2020-04-11

## 2020-04-11 LAB
ALBUMIN SERPL BCP-MCNC: 2.9 G/DL (ref 3.2–4.9)
ALBUMIN/GLOB SERPL: 1.1 G/DL
ALP SERPL-CCNC: 62 U/L (ref 30–99)
ALT SERPL-CCNC: 48 U/L (ref 2–50)
ANION GAP SERPL CALC-SCNC: 19 MMOL/L (ref 7–16)
AST SERPL-CCNC: 46 U/L (ref 12–45)
BASOPHILS # BLD AUTO: 0.6 % (ref 0–1.8)
BASOPHILS # BLD: 0.05 K/UL (ref 0–0.12)
BILIRUB SERPL-MCNC: 0.4 MG/DL (ref 0.1–1.5)
BUN SERPL-MCNC: 3 MG/DL (ref 8–22)
CALCIUM SERPL-MCNC: 8.2 MG/DL (ref 8.5–10.5)
CHLORIDE SERPL-SCNC: 104 MMOL/L (ref 96–112)
CO2 SERPL-SCNC: 13 MMOL/L (ref 20–33)
CREAT SERPL-MCNC: 0.43 MG/DL (ref 0.5–1.4)
EOSINOPHIL # BLD AUTO: 0.09 K/UL (ref 0–0.51)
EOSINOPHIL NFR BLD: 1 % (ref 0–6.9)
ERYTHROCYTE [DISTWIDTH] IN BLOOD BY AUTOMATED COUNT: 44.7 FL (ref 35.9–50)
GLOBULIN SER CALC-MCNC: 2.6 G/DL (ref 1.9–3.5)
GLUCOSE SERPL-MCNC: 97 MG/DL (ref 65–99)
HCT VFR BLD AUTO: 32.6 % (ref 37–47)
HGB BLD-MCNC: 11.1 G/DL (ref 12–16)
IMM GRANULOCYTES # BLD AUTO: 0.06 K/UL (ref 0–0.11)
IMM GRANULOCYTES NFR BLD AUTO: 0.7 % (ref 0–0.9)
LYMPHOCYTES # BLD AUTO: 0.62 K/UL (ref 1–4.8)
LYMPHOCYTES NFR BLD: 7 % (ref 22–41)
MCH RBC QN AUTO: 31.4 PG (ref 27–33)
MCHC RBC AUTO-ENTMCNC: 34 G/DL (ref 33.6–35)
MCV RBC AUTO: 92.4 FL (ref 81.4–97.8)
MONOCYTES # BLD AUTO: 0.72 K/UL (ref 0–0.85)
MONOCYTES NFR BLD AUTO: 8.2 % (ref 0–13.4)
NEUTROPHILS # BLD AUTO: 7.27 K/UL (ref 2–7.15)
NEUTROPHILS NFR BLD: 82.5 % (ref 44–72)
NRBC # BLD AUTO: 0 K/UL
NRBC BLD-RTO: 0 /100 WBC
PLATELET # BLD AUTO: 291 K/UL (ref 164–446)
PMV BLD AUTO: 9.7 FL (ref 9–12.9)
POTASSIUM SERPL-SCNC: 4.3 MMOL/L (ref 3.6–5.5)
PROT SERPL-MCNC: 5.5 G/DL (ref 6–8.2)
RBC # BLD AUTO: 3.53 M/UL (ref 4.2–5.4)
SODIUM SERPL-SCNC: 136 MMOL/L (ref 135–145)
WBC # BLD AUTO: 8.8 K/UL (ref 4.8–10.8)

## 2020-04-11 PROCEDURE — 85025 COMPLETE CBC W/AUTO DIFF WBC: CPT

## 2020-04-11 PROCEDURE — 80053 COMPREHEN METABOLIC PANEL: CPT

## 2020-04-11 PROCEDURE — 36415 COLL VENOUS BLD VENIPUNCTURE: CPT

## 2020-04-11 PROCEDURE — 700102 HCHG RX REV CODE 250 W/ 637 OVERRIDE(OP): Performed by: SURGERY

## 2020-04-11 PROCEDURE — A9270 NON-COVERED ITEM OR SERVICE: HCPCS | Performed by: SURGERY

## 2020-04-11 RX ORDER — TRAMADOL HYDROCHLORIDE 50 MG/1
50-100 TABLET ORAL EVERY 6 HOURS PRN
Qty: 40 TAB | Refills: 0 | Status: SHIPPED | OUTPATIENT
Start: 2020-04-11 | End: 2020-04-18

## 2020-04-11 RX ORDER — CELECOXIB 200 MG/1
200 CAPSULE ORAL DAILY
Qty: 60 CAP | Refills: 1 | Status: SHIPPED | OUTPATIENT
Start: 2020-04-12

## 2020-04-11 RX ORDER — OMEPRAZOLE 20 MG/1
20 CAPSULE, DELAYED RELEASE ORAL DAILY
Qty: 30 CAP | Refills: 11 | Status: SHIPPED | OUTPATIENT
Start: 2020-04-12

## 2020-04-11 RX ORDER — METOCLOPRAMIDE 10 MG/1
10 TABLET ORAL EVERY 6 HOURS
Qty: 60 TAB | Refills: 1 | Status: SHIPPED | OUTPATIENT
Start: 2020-04-11

## 2020-04-11 RX ADMIN — ANTACID TABLETS 500 MG: 500 TABLET, CHEWABLE ORAL at 04:40

## 2020-04-11 RX ADMIN — CELECOXIB 200 MG: 200 CAPSULE ORAL at 04:40

## 2020-04-11 RX ADMIN — OMEPRAZOLE 20 MG: 20 CAPSULE, DELAYED RELEASE ORAL at 04:41

## 2020-04-11 RX ADMIN — SPIRONOLACTONE 25 MG: 50 TABLET ORAL at 04:40

## 2020-04-11 RX ADMIN — METOCLOPRAMIDE 10 MG: 10 TABLET ORAL at 04:41

## 2020-04-11 NOTE — PROGRESS NOTES
Received bedside report from Freddie RN  Pt is AAOx4  REBOLLEDO  VSS  Denies pain  Denies SOB  -N/V; pt advanced to mechanical soft diet  +BS +Flatus Last BM 4/10  Pt has midline abdominal incision; MAYRA w/ staples  Old DULCE MARIA site w/ urostomy pouch on to collect drainage  Pt up se; w/ steady gait  POC discussed  Bed locked and in the lowest position  Call light w/in reach  Hourly rounding in place

## 2020-04-11 NOTE — DISCHARGE SUMMARY
HISTORY AND HOSPITAL COURSE:  The patient is a 69-year-old female patient   status post Whipple procedure with extended node dissection and resection and   extrahepatic duct resection up to the hilum for what appeared to be a   cholangiocarcinoma invading the pancreatic head as well as the main hepatic   duct and common bile duct.  Final pathology did show microscopic disease at   the level of the resection margin, but we are unclear on which side of the   resection margin the frozen section was positive.  In any event, the patient   will be treated as R1 resection postoperatively and I have spoken to Dr. Grace   at radiation oncology about needing chemoradiotherapy postop.  In any event,   the patient has done well.  She is postop day #8.  She is tolerating a full   liquid diet x36 hours without any nausea or vomiting.  She is ambulating.  She   is taking no pain medicine and all her drains are out.  She does have some   serosanguineous fluid drainage from one of her drain sites, but it is most   likely secondary to fluid overload.  Her I's and O's reveal that she is up   approximately almost 5 liters of fluid.  In any event, the patient is anxious   to go home, obviously with the COVID infection, our goal is to get the patient   home as soon as possible.  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    I have instructed her that we could start her on a soft mechanical diet,   multiple small meals today and then send her home, but the patient is anxious   to go home and she said that she will continue on full liquids for a day or   two and then advance herself over time.  I am comfortable with that because   the patient will contact me if there are any issues.  She does work in   healthcare and clearly understands the concern was for nausea, vomiting,   fever, or chills, which I have instructed to contact me for that.    PHYSICAL EXAMINATION:  Prior to discharge.  HEENT:   Extraocular movements are intact.  No sign of jaundice.  NECK:  Soft and supple.  There is no cervical, supraclavicular, or occipital   lymphadenopathy.  LUNGS:  Clear to auscultation.  Good inspiratory air.  CARDIOVASCULAR:  Regular rate and rhythm.  ABDOMEN:  Soft.  No tenderness at this time, but she does have an ureterostomy   bag on her right DULCE MARIA drain site that is draining.  She has been instructed how   to take care of this.  No peritoneal signs, rebound, or guarding.  EXTREMITIES:  No clubbing, cyanosis, or edema.    ASSESSMENT AND PLAN:  Patient is being discharged home today on a full liquid   diet and will advance herself per her request at home over the next several   days.  I have instructed her to stay away from salads and mostly focus on the   ground beef, ground meats, and well-cooked pastas.  The patient knows to call   me if she develops any fever, chills, nausea, vomiting, fever greater than   101, or nausea and vomiting.  She also understands to go slowly by advancing   her diet.  I am fairly confident most likely the source of her vomiting at   that time was potassium replacement p.o.  It seemed a pattern that when she   took her potassium, she would get nauseous and vomit.  She has not had any   potassium p.o. for the last 4 days and has had no bouts of nausea or vomiting.       ____________________________________     MD YAYA Sena / NTS    DD:  04/11/2020 09:56:21  DT:  04/11/2020 12:33:35    D#:  [id number]  Job#:  [id number]    cc: Dagoberto Grace MD

## 2020-04-11 NOTE — PROGRESS NOTES
Pt laying in bed, call light within reach, bed lowered and locked, fall education reinforced. Pt is A&Ox4 and on room air. Pt lung sounds are clear in all lobes, bowel sounds are hypoactive in all four quadrants, heart sounds are within defined limits. Pt IV is clean,dry,intact,and patent and infusing the appropriate fluids. Pt has a midline incision MAYRA with staples CDI. Pt has a lap site on the RLQ with an ostomy bag in place outputting a moderate amount of serous drainage. Pt is up self to ambulate with a steady gait.

## 2020-04-11 NOTE — DISCHARGE INSTRUCTIONS
Discharge Instructions    Discharged to home by car with relative. Discharged via wheelchair, hospital escort: Yes.  Special equipment needed: Not Applicable    Be sure to schedule a follow-up appointment with your primary care doctor or any specialists as instructed.     Discharge Plan:   Diet Plan: Discussed  Activity Level: Discussed  Confirmed Follow up Appointment: Patient to Call and Schedule Appointment  Confirmed Symptoms Management: Discussed  Medication Reconciliation Updated: Yes  Influenza Vaccine Indication: Not indicated: Previously immunized this influenza season and > 8 years of age    I understand that a diet low in cholesterol, fat, and sodium is recommended for good health. Unless I have been given specific instructions below for another diet, I accept this instruction as my diet prescription.   Other diet: Soft diet    Special Instructions: None    · Is patient discharged on Warfarin / Coumadin?   No     Depression / Suicide Risk    As you are discharged from this [facility] facility, it is important to learn how to keep safe from harming yourself.    Recognize the warning signs:  · Abrupt changes in personality, positive or negative- including increase in energy   · Giving away possessions  · Change in eating patterns- significant weight changes-  positive or negative  · Change in sleeping patterns- unable to sleep or sleeping all the time   · Unwillingness or inability to communicate  · Depression  · Unusual sadness, discouragement and loneliness  · Talk of wanting to die  · Neglect of personal appearance   · Rebelliousness- reckless behavior  · Withdrawal from people/activities they love  · Confusion- inability to concentrate     If you or a loved one observes any of these behaviors or has concerns about self-harm, here's what you can do:  · Talk about it- your feelings and reasons for harming yourself  · Remove any means that you might use to hurt yourself (examples: pills, rope, extension  cords, firearm)  · Get professional help from the community (Mental Health, Substance Abuse, psychological counseling)  · Do not be alone:Call your Safe Contact- someone whom you trust who will be there for you.  · Call your local CRISIS HOTLINE 962-5451 or 951-479-9826  · Call your local Children's Mobile Crisis Response Team [place] (583) 498-2371 or www.MapMy[place]  · Call the toll free National Suicide Prevention Hotlines   · National Suicide Prevention Lifeline 805-214-UFMP (6629)  · [place] Hope Line Network 800-SUICIDE (060-1339)    Whipple Procedure, Care After  Refer to this sheet in the next few weeks. These instructions provide you with information on caring for yourself after your procedure. Your caregiver may also give you more specific instructions. Your treatment has been planned according to current medical practices, but problems sometimes occur. Call your caregiver if you have any problems or questions after your procedure.  HOME CARE INSTRUCTIONS  Medicines  · Take pain medicine as prescribed by your caregiver. Do not take any over-the-counter pain medicines unless your caregiver says it is okay. Some pain medicines can cause bleeding for several weeks after surgery.  · Constipation is common after this procedure. You may need to take medicine to prevent this.  · Some people have trouble digesting food after a Whipple procedure. Your caregiver may give you medicine to help with digestion.  Wound care  · You may have drainage tubes still in place when you go home. These tubes need to stay in place until no more fluid is draining from your body. Your caregiver will explain what you need to do. Follow the instructions carefully. Note the daily amount of drainage and color of the fluid in the drain. Be sure to ask when you should return to have the tubes taken out.  · You may need to go back to have your stitches (sutures) or staples taken out. Make sure you know when to do  that.  · Carefully check your surgical cut (incision) area every day. Make sure there are no signs of infection, such as:  ¨ Pain.  ¨ Swelling.  ¨ Redness.  ¨ Warmth.  ¨ An opening of the incision.  ¨ Bleeding or leaking fluid.  · Keep the incision area dry. Do not use lotions or creams unless your caregiver tells you to.  Diet  · You may not feel like eating for a while. This is normal and may last for a few weeks. When you are able to eat, try to eat:  ¨ Fruits and vegetables.  ¨ Foods with lean protein. Examples are boneless and skinless chicken breasts, lean beef, egg whites, and seafood like tuna and shrimp.  ¨ Low-fat dairy products.  ¨ Foods that are high in fiber. Examples are whole grains, beans, most fruits, and nuts.  · Do not eat foods that contain a lot of fat. They can be hard to digest.  · Drink enough fluids to keep your urine clear or pale yellow. This helps prevent constipation.  · Try eating small portions more often than 3 times a day. Do not skip meals.  · Weigh yourself once a week. Wear the same amount of clothes each time you weigh yourself.  Activities  · Do not lift anything heavy for 3 to 6 weeks after your surgery. Do not lift anything heavier than 10 pounds (4.5 kg) until your caregiver says it is okay.  · Try to walk 100 yards (90 meters) every day. Do not push yourself too hard. After a few weeks, start to slowly increase how far you walk.  · You can take showers after your bandages are off. Do not take tub baths or swim until your caregiver says it is okay.  · Do not drive if you are taking narcotic pain medicines.  · Ask your caregiver whether it is okay for you do to certain activities, such as going back to work, driving a car, or having sex. It may be a few months before you can go back to all your normal activities.  Follow up  · Keep all your appointments. This is how your caregiver can tell if you are getting better.  · Call your caregiver with any questions.  SEEK MEDICAL CARE  IF:  · Your appetite does not get better.  · You have nausea that will not go away.  · You have constipation.  · Your pain does not go away, even after taking pain medicine.  · You become very thirsty, overly tired, dizzy, or you need to urinate often.  SEEK IMMEDIATE MEDICAL CARE IF:  · You cannot eat.  · You are vomiting.  · You have very bad pain that is getting worse.  · You are very tired.  · You have very bad constipation or diarrhea.  · Your skin around the incision or drainage tubes becomes swollen, red, or leaks blood or other fluid.  · Your incision or drainage area hurts.  · You notice a bad smell or a change in the color of fluid in the drainage tube.  · Your incision starts to open.  · You have a fever.  · You have chest pain or difficulty breathing.  MAKE SURE YOU:  · Understand these instructions.  · Will watch your condition.  · Will get help right away if you are not doing well or get worse.     This information is not intended to replace advice given to you by your health care provider. Make sure you discuss any questions you have with your health care provider.     Document Released: 07/30/2012 Document Revised: 01/08/2016 Document Reviewed: 07/30/2012  [employer] Interactive Patient Education ©2016 [employer]

## 2020-04-20 NOTE — PROGRESS NOTES
Subjective:   4/21/2020  9:57 AM  Primary care physician:Syeda Andrews M.D.  Referring Provider: Syeda Andrews MD  Other: Lyle Galicia MD    Chief Complaint:   Chief Complaint   Patient presents with   • Follow-Up     PO WHIPPLE/STAPLES CHOLANGIOCARCINOMA CA     Diagnosis:   1. Pancreatic mass     2. Pancreatic cyst     3. Cholangiocarcinoma of biliary tract (HCC)         History of presenting illness:  Silvio Cooney  is a pleasant 69 y.o. year old female who presented with follow-up status post Whipple procedure.  Her surgery was approximately 2 weeks ago.  Her pathology revealed a T2N0 with 22 nodes negative but there is a question of microscopic R1 disease at the margin.  The initial frozen section margins were negative but on the final pathology there was positive microscopic margin but we are unclear whether it is on the specimen side or on the proximal side/hilar side.  In any event, the patient will need adjuvant radiation treatments.  She denies any fever or chills, nausea or vomiting.  She is tolerating a general diet.  Her appetite is good.  She is doing extremely well.  Her operation was on April 3, 2020.      Past Medical History:   Diagnosis Date   • Anesthesia     PONV   • Arthritis     knees - osteoarthritis   • Cataract     bilat IOL    • Heart valve disease     mitral valve leak   • High cholesterol    • Hypertension    • Pain     abd pain     Past Surgical History:   Procedure Laterality Date   • WHIPPLE PROCEDURE  4/3/2020    Procedure: WHIPPLE PROCEDURE- OMENTAL FLAP, INTRAOPERATIVE ULTRASOUND;  Surgeon: Corwin Perez M.D.;  Location: SURGERY [facility];  Service: General   • NODE DISSECTION  4/3/2020    Procedure: LYMPHADENECTOMY;  Surgeon: Corwin Perez M.D.;  Location: SURGERY [facility];  Service: General   • CHOLECYSTECTOMY  4/3/2020    Procedure: CHOLECYSTECTOMY;  Surgeon: Corwin Perez M.D.;  Location: SURGERY [facility];  Service:  General   • BUNIONECTOMY     • TONSILLECTOMY     • OOPHORECTOMY Left    • APPENDECTOMY     • CATARACT EXTRACTION WITH IOL Bilateral      No Known Allergies  Outpatient Encounter Medications as of 2020   Medication Sig Dispense Refill   • celecoxib (CELEBREX) 200 MG Cap Take 1 Cap by mouth every day. 60 Cap 1   • metoclopramide (REGLAN) 10 MG Tab Take 1 Tab by mouth every 6 hours. 60 Tab 1   • omeprazole (PRILOSEC) 20 MG delayed-release capsule Take 1 Cap by mouth every day. 30 Cap 11   • lisinopril (PRINIVIL) 20 MG Tab Take 20 mg by mouth every evening.     • metoprolol SR (TOPROL XL) 25 MG TABLET SR 24 HR Take 25 mg by mouth every day.     • simvastatin (ZOCOR) 40 MG Tab Take 40 mg by mouth every evening.     • lisinopril-hydrochlorothiazide (PRINZIDE) 20-25 MG per tablet Take 1 Tab by mouth every day.       No facility-administered encounter medications on file as of 2020.      Social History     Socioeconomic History   • Marital status:      Spouse name: Not on file   • Number of children: Not on file   • Years of education: Not on file   • Highest education level: Not on file   Occupational History   • Not on file   Social Needs   • Financial resource strain: Not on file   • Food insecurity     Worry: Not on file     Inability: Not on file   • Transportation needs     Medical: Not on file     Non-medical: Not on file   Tobacco Use   • Smoking status: Former Smoker     Packs/day: 1.00     Years: 20.00     Pack years: 20.00     Types: Cigarettes     Last attempt to quit:      Years since quittin.3   • Smokeless tobacco: Never Used   Substance and Sexual Activity   • Alcohol use: Not Currently   • Drug use: Not Currently   • Sexual activity: Not on file   Lifestyle   • Physical activity     Days per week: Not on file     Minutes per session: Not on file   • Stress: Not on file   Relationships   • Social connections     Talks on phone: Not on file     Gets together: Not on  "file     Attends Congregational service: Not on file     Active member of club or organization: Not on file     Attends meetings of clubs or organizations: Not on file     Relationship status: Not on file   • Intimate partner violence     Fear of current or ex partner: Not on file     Emotionally abused: Not on file     Physically abused: Not on file     Forced sexual activity: Not on file   Other Topics Concern   • Not on file   Social History Narrative   • Not on file      Social History     Tobacco Use   Smoking Status Former Smoker   • Packs/day: 1.00   • Years: 20.00   • Pack years: 20.00   • Types: Cigarettes   • Last attempt to quit:    • Years since quittin.3   Smokeless Tobacco Never Used     Social History     Substance and Sexual Activity   Alcohol Use Not Currently     Social History     Substance and Sexual Activity   Drug Use Not Currently        History reviewed. No pertinent family history.   No pertinent family history on file      Review of Systems   Constitutional: Positive for malaise/fatigue.   All other systems reviewed and are negative.       Objective:   /72 (BP Location: Left arm, Patient Position: Sitting, BP Cuff Size: Adult)   Pulse 95   Temp 36.4 °C (97.5 °F) (Temporal)   Ht 1.626 m (5' 4\")   Wt 63 kg (139 lb)   SpO2 96%   BMI 23.86 kg/m²     Physical Exam   Abdominal: Soft. Bowel sounds are normal.   Minimal to no tenderness.  Her incision is clean, dry, and intact.       Labs:  Results for RASHEEDA LYNCH (MRN [id number]) as of 2020 13:56   Ref. Range 2020 01:06   WBC Latest Ref Range: 4.8 - 10.8 K/uL 8.8   RBC Latest Ref Range: 4.20 - 5.40 M/uL 3.53 (L)   Hemoglobin Latest Ref Range: 12.0 - 16.0 g/dL 11.1 (L)   Hematocrit Latest Ref Range: 37.0 - 47.0 % 32.6 (L)   MCV Latest Ref Range: 81.4 - 97.8 fL 92.4   MCH Latest Ref Range: 27.0 - 33.0 pg 31.4   MCHC Latest Ref Range: 33.6 - 35.0 g/dL 34.0   RDW Latest Ref Range: 35.9 - 50.0 fL 44.7   Platelet Count " Latest Ref Range: 164 - 446 K/uL 291   MPV Latest Ref Range: 9.0 - 12.9 fL 9.7   Neutrophils-Polys Latest Ref Range: 44.00 - 72.00 % 82.50 (H)   Neutrophils (Absolute) Latest Ref Range: 2.00 - 7.15 K/uL 7.27 (H)   Lymphocytes Latest Ref Range: 22.00 - 41.00 % 7.00 (L)   Lymphs (Absolute) Latest Ref Range: 1.00 - 4.80 K/uL 0.62 (L)   Monocytes Latest Ref Range: 0.00 - 13.40 % 8.20   Monos (Absolute) Latest Ref Range: 0.00 - 0.85 K/uL 0.72   Eosinophils Latest Ref Range: 0.00 - 6.90 % 1.00   Eos (Absolute) Latest Ref Range: 0.00 - 0.51 K/uL 0.09   Basophils Latest Ref Range: 0.00 - 1.80 % 0.60   Baso (Absolute) Latest Ref Range: 0.00 - 0.12 K/uL 0.05   Immature Granulocytes Latest Ref Range: 0.00 - 0.90 % 0.70   Immature Granulocytes (abs) Latest Ref Range: 0.00 - 0.11 K/uL 0.06   Nucleated RBC Latest Units: /100 WBC 0.00   NRBC (Absolute) Latest Units: K/uL 0.00   Sodium Latest Ref Range: 135 - 145 mmol/L 136   Potassium Latest Ref Range: 3.6 - 5.5 mmol/L 4.3   Chloride Latest Ref Range: 96 - 112 mmol/L 104   Co2 Latest Ref Range: 20 - 33 mmol/L 13 (L)   Anion Gap Latest Ref Range: 7.0 - 16.0  19.0 (H)   Glucose Latest Ref Range: 65 - 99 mg/dL 97   Bun Latest Ref Range: 8 - 22 mg/dL 3 (L)   Creatinine Latest Ref Range: 0.50 - 1.40 mg/dL 0.43 (L)   GFR If  Latest Ref Range: >60 mL/min/1.73 m 2 >60   GFR If Non  Latest Ref Range: >60 mL/min/1.73 m 2 >60   Calcium Latest Ref Range: 8.5 - 10.5 mg/dL 8.2 (L)   AST(SGOT) Latest Ref Range: 12 - 45 U/L 46 (H)   ALT(SGPT) Latest Ref Range: 2 - 50 U/L 48   Alkaline Phosphatase Latest Ref Range: 30 - 99 U/L 62   Total Bilirubin Latest Ref Range: 0.1 - 1.5 mg/dL 0.4   Albumin Latest Ref Range: 3.2 - 4.9 g/dL 2.9 (L)   Total Protein Latest Ref Range: 6.0 - 8.2 g/dL 5.5 (L)   Globulin Latest Ref Range: 1.9 - 3.5 g/dL 2.6   A-G Ratio Latest Units: g/dL 1.1       Imaging:  Per my read, MRCP 3/20/20 [facility]            Pathology: 4/3/20    FINAL  DIAGNOSIS:    A. Segment 5 liver nodule:         Benign bile duct hamartoma  B. Gallbladder:         Benign gallbladder showing chronic cholecystitis and          cholesterolosis  C. Celiac node:         Benign lymph node (0/1)  D. Common hepatic duct margin:         Hepatic duct margin showing a small focus of invasive          adenocarcinoma and focal intraepithelial severe dysplasia, see          comment.  E. Portal node:         Benign lymph node (0/1)  F. Pancreas:         Invasive moderately to poorly differentiated adenocarcinoma of          the common bile duct; the tumor invades to a depth of about 0.9          mm into the adjacent periductal, peripancreatic and          periduodenal fibroadipose tissue; the invasive tumor abuts both          pancreatic parenchyma and the duodenum but does not directly          invade the pancreas or duodenum.         There is extensive perineural invasion.         The hepatic duct margin is involved by invasive carcinoma but          the other margins are free of tumor, see comment         Thirteen lymph nodes showing no evidence of metastatic tumor          (0/13)  G. Antrum:         Benign portion of stomach showing prominent chronic gastritis.         Seven link-gastic lymph nodes showing no evidence of metastatic          tumor (0/7)    Synoptic Report for Carcinoma of Distal Extrahepatic Bile Ducts:           -Procedure: Pancreaticoduodenectomy (Whipple resection)         -Tumor Site: Common bile duct, extrapancreatic         -Tumor Size: 7.5 cm         -Histologic Type: Adenocarcinoma, biliary-type         -Histologic Grade: Moderately to poorly differentiated, grade          2-3         -Tumor Extension: Tumor invades beyond the wall of the bile duct         -Depth of Tumor Extension: 0.9 cm         -Margins:                 Hepatic duct margin: Focally involved by invasive          adenocarcinoma and intraepithelial severe dysplasia, see          comment.                    Pancreatic neck margin: Free of tumor by 2.2 cm                    Uncinate margin: Free of tumor by 5.1 cm                    Retroperitoneal margin: Free of tumor by 1.6 cm                    Distal duodenal margin: Free of tumor by 1.5 cm         -Lymph-Vascular Invasion: Not identified         -Perineural Invasion: Present         -Regional Lymph Nodes:           Number of Lymph Nodes Involved: Zero           Number of Lymph Nodes Examined: 22         -Pathologic Staging:          Primary Tumor: *pT2          Regional Lymph Nodes: *pN0          Distant Metastasis: Not applicable.         -Additional Pathologic Findings: Not identified         -Ancillary Studies: Not performed    Procedures: 4/3/20    1.  Standard Whipple procedure with partial gastrectomy and BII gastrojejunostomy.  2.  Lymph node dissection of the celiac and portal regions.  3.  Omental flap to the pancreaticojejunostomy.  4.  Intraoperative ultrasound survey of the pancreas, liver, bile ducts.  5.  Liver wedge resection segment 5.    Diagnosis:     1. Pancreatic mass     2. Pancreatic cyst     3. Cholangiocarcinoma of biliary tract (HCC)             Medical Decision Making:  Today's Assessment / Status / Plan:     In light of the present findings, the patient is doing well.  Her pathology did show microscopic positive margin on the final path on the specimen side, but it is unclear whether it is a true margin.  My recommendation was to proceed with chemoradiotherapy postop.  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  The patient will proceed with adjuvant radiotherapy and follow-up with me with surveillance.  In the meantime we will see her in 2 weeks for a postop visit via telemedicine.  The staples were removed in the office today.

## 2020-04-21 ENCOUNTER — OFFICE VISIT (OUTPATIENT)
Dept: SURGICAL ONCOLOGY | Facility: MEDICAL CENTER | Age: 70
End: 2020-04-21
Payer: COMMERCIAL

## 2020-04-21 VITALS
SYSTOLIC BLOOD PRESSURE: 138 MMHG | OXYGEN SATURATION: 96 % | WEIGHT: 139 LBS | TEMPERATURE: 97.5 F | BODY MASS INDEX: 23.73 KG/M2 | DIASTOLIC BLOOD PRESSURE: 72 MMHG | HEIGHT: 64 IN | HEART RATE: 95 BPM

## 2020-04-21 DIAGNOSIS — K86.89 PANCREATIC MASS: ICD-10-CM

## 2020-04-21 DIAGNOSIS — K86.2 PANCREATIC CYST: ICD-10-CM

## 2020-04-21 DIAGNOSIS — C22.1 CHOLANGIOCARCINOMA OF BILIARY TRACT (HCC): ICD-10-CM

## 2020-04-21 PROCEDURE — 99024 POSTOP FOLLOW-UP VISIT: CPT | Performed by: SURGERY

## 2020-04-21 ASSESSMENT — FIBROSIS 4 INDEX: FIB4 SCORE: 1.6

## 2020-05-04 NOTE — PROGRESS NOTES
Subjective:   5/5/2020  9:00 AM  Primary care physician:Syeda Andrews M.D.  Referring Provider: Syeda Andrews MD  Other: Lyle Galicia MD  Medical Oncology: Dr Araujo       Chief Complaint: No chief complaint on file.    Diagnosis:   1. Cholangiocarcinoma of biliary tract (HCC)     2. Pancreatic mass         This encounter was conducted via [email or web address].   Verbal consent was obtained. Patient's identity was verified.    Patient was seen for 15 minutes face to face of which > 50% of appointment time was spent on counseling and coordination of care regarding the above.    History of presenting illness:  Silvio Cooney  is a pleasant 69 y.o. year old female who presented with postop for her last visit after having undergone a Whipple procedure for a cholangiocarcinoma.  She is doing extremely well.  She denies any fever or chills, nausea or vomiting.  She is scheduled to start her chemoradiotherapy next week.      Past Medical History:   Diagnosis Date   • Anesthesia     PONV   • Arthritis     knees - osteoarthritis   • Cataract     bilat IOL    • Heart valve disease     mitral valve leak   • High cholesterol    • Hypertension    • Pain     abd pain     Past Surgical History:   Procedure Laterality Date   • WHIPPLE PROCEDURE  4/3/2020    Procedure: WHIPPLE PROCEDURE- OMENTAL FLAP, INTRAOPERATIVE ULTRASOUND;  Surgeon: Corwin Perez M.D.;  Location: SURGERY [facility];  Service: General   • NODE DISSECTION  4/3/2020    Procedure: LYMPHADENECTOMY;  Surgeon: Corwin Perez M.D.;  Location: SURGERY [facility];  Service: General   • CHOLECYSTECTOMY  4/3/2020    Procedure: CHOLECYSTECTOMY;  Surgeon: Corwin Perez M.D.;  Location: SURGERY [facility];  Service: General   • BUNIONECTOMY  1990   • TONSILLECTOMY  1972   • OOPHORECTOMY Left 1968   • APPENDECTOMY  1968   • CATARACT EXTRACTION WITH IOL Bilateral      No Known Allergies  Outpatient Encounter Medications as of 5/5/2020    Medication Sig Dispense Refill   • celecoxib (CELEBREX) 200 MG Cap Take 1 Cap by mouth every day. 60 Cap 1   • metoclopramide (REGLAN) 10 MG Tab Take 1 Tab by mouth every 6 hours. 60 Tab 1   • omeprazole (PRILOSEC) 20 MG delayed-release capsule Take 1 Cap by mouth every day. 30 Cap 11   • lisinopril (PRINIVIL) 20 MG Tab Take 20 mg by mouth every evening.     • metoprolol SR (TOPROL XL) 25 MG TABLET SR 24 HR Take 25 mg by mouth every day.     • simvastatin (ZOCOR) 40 MG Tab Take 40 mg by mouth every evening.     • lisinopril-hydrochlorothiazide (PRINZIDE) 20-25 MG per tablet Take 1 Tab by mouth every day.       No facility-administered encounter medications on file as of 2020.      Social History     Socioeconomic History   • Marital status:      Spouse name: Not on file   • Number of children: Not on file   • Years of education: Not on file   • Highest education level: Not on file   Occupational History   • Not on file   Social Needs   • Financial resource strain: Not on file   • Food insecurity     Worry: Not on file     Inability: Not on file   • Transportation needs     Medical: Not on file     Non-medical: Not on file   Tobacco Use   • Smoking status: Former Smoker     Packs/day: 1.00     Years: 20.00     Pack years: 20.00     Types: Cigarettes     Last attempt to quit: 1990     Years since quittin.3   • Smokeless tobacco: Never Used   Substance and Sexual Activity   • Alcohol use: Not Currently   • Drug use: Not Currently   • Sexual activity: Not on file   Lifestyle   • Physical activity     Days per week: Not on file     Minutes per session: Not on file   • Stress: Not on file   Relationships   • Social connections     Talks on phone: Not on file     Gets together: Not on file     Attends Gnosticism service: Not on file     Active member of club or organization: Not on file     Attends meetings of clubs or organizations: Not on file     Relationship status: Not on file   • Intimate partner  violence     Fear of current or ex partner: Not on file     Emotionally abused: Not on file     Physically abused: Not on file     Forced sexual activity: Not on file   Other Topics Concern   • Not on file   Social History Narrative   • Not on file      Social History     Tobacco Use   Smoking Status Former Smoker   • Packs/day: 1.00   • Years: 20.00   • Pack years: 20.00   • Types: Cigarettes   • Last attempt to quit:    • Years since quittin.3   Smokeless Tobacco Never Used     Social History     Substance and Sexual Activity   Alcohol Use Not Currently     Social History     Substance and Sexual Activity   Drug Use Not Currently        History reviewed. No pertinent family history.  No pertinent family history on file    Review of Systems   All other systems reviewed and are negative.       Objective:   There were no vitals taken for this visit.    Physical Exam   Abdominal: Soft. Bowel sounds are normal.   Her incision is clean, dry, and intact.   Nursing note and vitals reviewed.      Labs:  Results for RASHEEDA LYNCH (MRN [id number]) as of 2020 12:04   Ref. Range 2020 01:06   WBC Latest Ref Range: 4.8 - 10.8 K/uL 8.8   RBC Latest Ref Range: 4.20 - 5.40 M/uL 3.53 (L)   Hemoglobin Latest Ref Range: 12.0 - 16.0 g/dL 11.1 (L)   Hematocrit Latest Ref Range: 37.0 - 47.0 % 32.6 (L)   MCV Latest Ref Range: 81.4 - 97.8 fL 92.4   MCH Latest Ref Range: 27.0 - 33.0 pg 31.4   MCHC Latest Ref Range: 33.6 - 35.0 g/dL 34.0   RDW Latest Ref Range: 35.9 - 50.0 fL 44.7   Platelet Count Latest Ref Range: 164 - 446 K/uL 291   MPV Latest Ref Range: 9.0 - 12.9 fL 9.7   Neutrophils-Polys Latest Ref Range: 44.00 - 72.00 % 82.50 (H)   Neutrophils (Absolute) Latest Ref Range: 2.00 - 7.15 K/uL 7.27 (H)   Lymphocytes Latest Ref Range: 22.00 - 41.00 % 7.00 (L)   Lymphs (Absolute) Latest Ref Range: 1.00 - 4.80 K/uL 0.62 (L)   Monocytes Latest Ref Range: 0.00 - 13.40 % 8.20   Monos (Absolute) Latest Ref Range: 0.00 - 0.85  K/uL 0.72   Eosinophils Latest Ref Range: 0.00 - 6.90 % 1.00   Eos (Absolute) Latest Ref Range: 0.00 - 0.51 K/uL 0.09   Basophils Latest Ref Range: 0.00 - 1.80 % 0.60   Baso (Absolute) Latest Ref Range: 0.00 - 0.12 K/uL 0.05   Immature Granulocytes Latest Ref Range: 0.00 - 0.90 % 0.70   Immature Granulocytes (abs) Latest Ref Range: 0.00 - 0.11 K/uL 0.06   Nucleated RBC Latest Units: /100 WBC 0.00   NRBC (Absolute) Latest Units: K/uL 0.00   Sodium Latest Ref Range: 135 - 145 mmol/L 136   Potassium Latest Ref Range: 3.6 - 5.5 mmol/L 4.3   Chloride Latest Ref Range: 96 - 112 mmol/L 104   Co2 Latest Ref Range: 20 - 33 mmol/L 13 (L)   Anion Gap Latest Ref Range: 7.0 - 16.0  19.0 (H)   Glucose Latest Ref Range: 65 - 99 mg/dL 97   Bun Latest Ref Range: 8 - 22 mg/dL 3 (L)   Creatinine Latest Ref Range: 0.50 - 1.40 mg/dL 0.43 (L)   GFR If  Latest Ref Range: >60 mL/min/1.73 m 2 >60   GFR If Non  Latest Ref Range: >60 mL/min/1.73 m 2 >60   Calcium Latest Ref Range: 8.5 - 10.5 mg/dL 8.2 (L)   AST(SGOT) Latest Ref Range: 12 - 45 U/L 46 (H)   ALT(SGPT) Latest Ref Range: 2 - 50 U/L 48   Alkaline Phosphatase Latest Ref Range: 30 - 99 U/L 62   Total Bilirubin Latest Ref Range: 0.1 - 1.5 mg/dL 0.4   Albumin Latest Ref Range: 3.2 - 4.9 g/dL 2.9 (L)   Total Protein Latest Ref Range: 6.0 - 8.2 g/dL 5.5 (L)   Globulin Latest Ref Range: 1.9 - 3.5 g/dL 2.6   A-G Ratio Latest Units: g/dL 1.1       Imaging:  Per my read, MRCP 3/20/20 [facility]             Pathology: 4/3/20     FINAL DIAGNOSIS:    A. Segment 5 liver nodule:         Benign bile duct hamartoma  B. Gallbladder:         Benign gallbladder showing chronic cholecystitis and          cholesterolosis  C. Celiac node:         Benign lymph node (0/1)  D. Common hepatic duct margin:         Hepatic duct margin showing a small focus of invasive          adenocarcinoma and focal intraepithelial severe dysplasia, see          comment.  E. Portal node:          Benign lymph node (0/1)  F. Pancreas:         Invasive moderately to poorly differentiated adenocarcinoma of          the common bile duct; the tumor invades to a depth of about 0.9          mm into the adjacent periductal, peripancreatic and          periduodenal fibroadipose tissue; the invasive tumor abuts both          pancreatic parenchyma and the duodenum but does not directly          invade the pancreas or duodenum.         There is extensive perineural invasion.         The hepatic duct margin is involved by invasive carcinoma but          the other margins are free of tumor, see comment         Thirteen lymph nodes showing no evidence of metastatic tumor          (0/13)  G. Antrum:         Benign portion of stomach showing prominent chronic gastritis.         Seven link-gastic lymph nodes showing no evidence of metastatic          tumor (0/7)    Synoptic Report for Carcinoma of Distal Extrahepatic Bile Ducts:           -Procedure: Pancreaticoduodenectomy (Whipple resection)         -Tumor Site: Common bile duct, extrapancreatic         -Tumor Size: 7.5 cm         -Histologic Type: Adenocarcinoma, biliary-type         -Histologic Grade: Moderately to poorly differentiated, grade          2-3         -Tumor Extension: Tumor invades beyond the wall of the bile duct         -Depth of Tumor Extension: 0.9 cm         -Margins:                 Hepatic duct margin: Focally involved by invasive          adenocarcinoma and intraepithelial severe dysplasia, see          comment.                   Pancreatic neck margin: Free of tumor by 2.2 cm                    Uncinate margin: Free of tumor by 5.1 cm                    Retroperitoneal margin: Free of tumor by 1.6 cm                    Distal duodenal margin: Free of tumor by 1.5 cm         -Lymph-Vascular Invasion: Not identified         -Perineural Invasion: Present         -Regional Lymph Nodes:           Number of Lymph Nodes Involved: Zero           Number of  Lymph Nodes Examined: 22         -Pathologic Staging:          Primary Tumor: *pT2          Regional Lymph Nodes: *pN0          Distant Metastasis: Not applicable.         -Additional Pathologic Findings: Not identified         -Ancillary Studies: Not performed     Procedures: 4/3/20     1.  Standard Whipple procedure with partial gastrectomy and BII gastrojejunostomy.  2.  Lymph node dissection of the celiac and portal regions.  3.  Omental flap to the pancreaticojejunostomy.  4.  Intraoperative ultrasound survey of the pancreas, liver, bile ducts.  5.  Liver wedge resection segment 5.       Diagnosis:     1. Cholangiocarcinoma of biliary tract (HCC)     2. Pancreatic mass             Medical Decision Making:  Today's Assessment / Status / Plan:     In light of the present findings, we will order a pancreas protocol CT scan for July, and the patient will follow-up with me after it has been completed.    I, Dr. Perez have entered, reviewed and confirmed the above diagnoses related to this patient on this date of service, 5/5/2020  9:00 AM.    She agreed and verbalized her agreement and understanding with the current plan. I answered all questions and concerns she has at this time and advised her to call at any time in the interim with questions or concerns in regards to her care.    Thank you for allowing me to participate in her care, I will continue to follow closely.       Please note that this dictation was created using voice recognition software. I have made every reasonable attempt to correct obvious errors, but I expect that there are errors of grammar and possibly content that I did not discover before finalizing the note.     Thank you for this consultation and allowing me to participate in your patient's care. If I can be of further service please contact my office.

## 2020-05-05 ENCOUNTER — TELEMEDICINE (OUTPATIENT)
Dept: SURGICAL ONCOLOGY | Facility: MEDICAL CENTER | Age: 70
End: 2020-05-05
Payer: COMMERCIAL

## 2020-05-05 DIAGNOSIS — K86.89 PANCREATIC MASS: ICD-10-CM

## 2020-05-05 DIAGNOSIS — C22.1 CHOLANGIOCARCINOMA OF BILIARY TRACT (HCC): ICD-10-CM

## 2020-05-05 PROCEDURE — 99024 POSTOP FOLLOW-UP VISIT: CPT | Mod: 95,CR | Performed by: SURGERY

## 2020-05-05 NOTE — PATIENT INSTRUCTIONS
I will see the patient at her next surveillance imaging at the end of July with a pancreas protocol CT scan which we ordered.

## 2020-07-27 NOTE — PROGRESS NOTES
Subjective:   7/28/2020  8:30 AM  Primary care physician:Syeda Andrews M.D.  Radiation oncology:Fady Grace MD   Referring Provider:  Syeda Andrews MD  Other: Lyle Galicia MD  Medical Oncology: Real Araujo MD     Chief Complaint:   Chief Complaint   Patient presents with   • Follow-Up     [facility] CT CHOLANGIOCARCINOMA     Diagnosis:   1. Cholangiocarcinoma of biliary tract (HCC)     2. Pancreatic mass     3. Abdominal pain, unspecified abdominal location         History of presenting illness:  Silvio Cooney  is a pleasant 70 y.o. year old female who presented with surveillance status post Whipple procedure.  Her pathology did return as a cholangiocarcinoma and she did have some microscopic disease at the resection margin which initially came back negative.  She had all her nodes were negative with a total count of 22.  She did undergo adjuvant chemotherapy and radiation which she completed back in June.  She denies any fever or chills, nausea or vomiting.  She has no diarrhea or signs of pancreatic exocrine insufficiency.  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  She has no pain.  She looks terrific.  I personally reviewed the patient's CT scan from [place] dated July 23, 2020.  There is no sign of metastatic disease.  There are some small cysts in the liver.  The Whipple reconstruction looks good.  The pancreatic stent is in place.  There is no adenopathy or peritoneal disease.  There is minimal edema from radiation.      Past Medical History:   Diagnosis Date   • Anesthesia     PONV   • Arthritis     knees - osteoarthritis   • Cataract     bilat IOL    • Heart valve disease     mitral valve leak   • High cholesterol    • Hypertension    • Pain     abd pain     Past Surgical History:   Procedure Laterality Date   • WHIPPLE PROCEDURE  4/3/2020    Procedure: WHIPPLE PROCEDURE- OMENTAL FLAP, INTRAOPERATIVE ULTRASOUND;  Surgeon: Corwin HEADLEY  HENRY Perez;  Location: SURGERY [facility];  Service: General   • NODE DISSECTION  4/3/2020    Procedure: LYMPHADENECTOMY;  Surgeon: Corwin Perez M.D.;  Location: SURGERY [facility];  Service: General   • CHOLECYSTECTOMY  4/3/2020    Procedure: CHOLECYSTECTOMY;  Surgeon: Corwin Perez M.D.;  Location: SURGERY [facility];  Service: General   • BUNIONECTOMY     • TONSILLECTOMY     • OOPHORECTOMY Left    • APPENDECTOMY     • CATARACT EXTRACTION WITH IOL Bilateral      No Known Allergies  Outpatient Encounter Medications as of 2020   Medication Sig Dispense Refill   • celecoxib (CELEBREX) 200 MG Cap Take 1 Cap by mouth every day. 60 Cap 1   • metoclopramide (REGLAN) 10 MG Tab Take 1 Tab by mouth every 6 hours. 60 Tab 1   • omeprazole (PRILOSEC) 20 MG delayed-release capsule Take 1 Cap by mouth every day. 30 Cap 11   • lisinopril (PRINIVIL) 20 MG Tab Take 20 mg by mouth every evening.     • metoprolol SR (TOPROL XL) 25 MG TABLET SR 24 HR Take 25 mg by mouth every day.     • simvastatin (ZOCOR) 40 MG Tab Take 40 mg by mouth every evening.     • lisinopril-hydrochlorothiazide (PRINZIDE) 20-25 MG per tablet Take 1 Tab by mouth every day.       No facility-administered encounter medications on file as of 2020.      Social History     Socioeconomic History   • Marital status:      Spouse name: Not on file   • Number of children: Not on file   • Years of education: Not on file   • Highest education level: Not on file   Occupational History   • Not on file   Social Needs   • Financial resource strain: Not on file   • Food insecurity     Worry: Not on file     Inability: Not on file   • Transportation needs     Medical: Not on file     Non-medical: Not on file   Tobacco Use   • Smoking status: Former Smoker     Packs/day: 1.00     Years: 20.00     Pack years: 20.00     Types: Cigarettes     Last attempt to quit:      Years since quittin.5  "  • Smokeless tobacco: Never Used   Substance and Sexual Activity   • Alcohol use: Not Currently   • Drug use: Not Currently   • Sexual activity: Not on file   Lifestyle   • Physical activity     Days per week: Not on file     Minutes per session: Not on file   • Stress: Not on file   Relationships   • Social connections     Talks on phone: Not on file     Gets together: Not on file     Attends [employer] service: Not on file     Active member of club or organization: Not on file     Attends meetings of clubs or organizations: Not on file     Relationship status: Not on file   • Intimate partner violence     Fear of current or ex partner: Not on file     Emotionally abused: Not on file     Physically abused: Not on file     Forced sexual activity: Not on file   Other Topics Concern   • Not on file   Social History Narrative   • Not on file      Social History     Tobacco Use   Smoking Status Former Smoker   • Packs/day: 1.00   • Years: 20.00   • Pack years: 20.00   • Types: Cigarettes   • Last attempt to quit:    • Years since quittin.5   Smokeless Tobacco Never Used     Social History     Substance and Sexual Activity   Alcohol Use Not Currently     Social History     Substance and Sexual Activity   Drug Use Not Currently        No family history on file.  No pertinent family history on file    Review of Systems   All other systems reviewed and are negative.       Objective:   /72 (BP Location: Left arm, Patient Position: Sitting, BP Cuff Size: Adult)   Pulse 83   Temp 36.2 °C (97.1 °F) (Temporal)   Ht 1.626 m (5' 4\")   Wt 62.1 kg (137 lb)   SpO2 97%   BMI 23.52 kg/m²     Physical Exam   Constitutional: She is oriented to person, place, and time. She appears well-developed and well-nourished.   HENT:   Head: Normocephalic and atraumatic.   Eyes: Pupils are equal, round, and reactive to light. Conjunctivae are normal.   Neck: Normal range of motion. Neck supple.   Cardiovascular: Normal rate and " regular rhythm.   Pulmonary/Chest: Effort normal and breath sounds normal.   Abdominal: Soft. Bowel sounds are normal.   Musculoskeletal: Normal range of motion.   Neurological: She is alert and oriented to person, place, and time.   Skin: Skin is warm and dry.   Psychiatric: She has a normal mood and affect. Her behavior is normal. Judgment and thought content normal.   Nursing note and vitals reviewed.      Labs:  Results for RASHEEDA LYNCH (MRN [id number]) as of 7/27/2020 09:31   Ref. Range 4/11/2020 01:06   WBC Latest Ref Range: 4.8 - 10.8 K/uL 8.8   RBC Latest Ref Range: 4.20 - 5.40 M/uL 3.53 (L)   Hemoglobin Latest Ref Range: 12.0 - 16.0 g/dL 11.1 (L)   Hematocrit Latest Ref Range: 37.0 - 47.0 % 32.6 (L)   MCV Latest Ref Range: 81.4 - 97.8 fL 92.4   MCH Latest Ref Range: 27.0 - 33.0 pg 31.4   MCHC Latest Ref Range: 33.6 - 35.0 g/dL 34.0   RDW Latest Ref Range: 35.9 - 50.0 fL 44.7   Platelet Count Latest Ref Range: 164 - 446 K/uL 291   MPV Latest Ref Range: 9.0 - 12.9 fL 9.7   Neutrophils-Polys Latest Ref Range: 44.00 - 72.00 % 82.50 (H)   Neutrophils (Absolute) Latest Ref Range: 2.00 - 7.15 K/uL 7.27 (H)   Lymphocytes Latest Ref Range: 22.00 - 41.00 % 7.00 (L)   Lymphs (Absolute) Latest Ref Range: 1.00 - 4.80 K/uL 0.62 (L)   Monocytes Latest Ref Range: 0.00 - 13.40 % 8.20   Monos (Absolute) Latest Ref Range: 0.00 - 0.85 K/uL 0.72   Eosinophils Latest Ref Range: 0.00 - 6.90 % 1.00   Eos (Absolute) Latest Ref Range: 0.00 - 0.51 K/uL 0.09   Basophils Latest Ref Range: 0.00 - 1.80 % 0.60   Baso (Absolute) Latest Ref Range: 0.00 - 0.12 K/uL 0.05   Immature Granulocytes Latest Ref Range: 0.00 - 0.90 % 0.70   Immature Granulocytes (abs) Latest Ref Range: 0.00 - 0.11 K/uL 0.06   Nucleated RBC Latest Units: /100 WBC 0.00   NRBC (Absolute) Latest Units: K/uL 0.00   Sodium Latest Ref Range: 135 - 145 mmol/L 136   Potassium Latest Ref Range: 3.6 - 5.5 mmol/L 4.3   Chloride Latest Ref Range: 96 - 112 mmol/L 104   Co2  Latest Ref Range: 20 - 33 mmol/L 13 (L)   Anion Gap Latest Ref Range: 7.0 - 16.0  19.0 (H)   Glucose Latest Ref Range: 65 - 99 mg/dL 97   Bun Latest Ref Range: 8 - 22 mg/dL 3 (L)   Creatinine Latest Ref Range: 0.50 - 1.40 mg/dL 0.43 (L)   GFR If  Latest Ref Range: >60 mL/min/1.73 m 2 >60   GFR If Non  Latest Ref Range: >60 mL/min/1.73 m 2 >60   Calcium Latest Ref Range: 8.5 - 10.5 mg/dL 8.2 (L)   AST(SGOT) Latest Ref Range: 12 - 45 U/L 46 (H)   ALT(SGPT) Latest Ref Range: 2 - 50 U/L 48   Alkaline Phosphatase Latest Ref Range: 30 - 99 U/L 62   Total Bilirubin Latest Ref Range: 0.1 - 1.5 mg/dL 0.4   Albumin Latest Ref Range: 3.2 - 4.9 g/dL 2.9 (L)   Total Protein Latest Ref Range: 6.0 - 8.2 g/dL 5.5 (L)   Globulin Latest Ref Range: 1.9 - 3.5 g/dL 2.6   A-G Ratio Latest Units: g/dL 1.1       Imaging: CT 7/23/20 [facility]   Per my read,        Pathology: 4/3/20     FINAL DIAGNOSIS:    A. Segment 5 liver nodule:         Benign bile duct hamartoma  B. Gallbladder:         Benign gallbladder showing chronic cholecystitis and          cholesterolosis  C. Celiac node:         Benign lymph node (0/1)  D. Common hepatic duct margin:         Hepatic duct margin showing a small focus of invasive          adenocarcinoma and focal intraepithelial severe dysplasia, see          comment.  E. Portal node:         Benign lymph node (0/1)  F. Pancreas:         Invasive moderately to poorly differentiated adenocarcinoma of          the common bile duct; the tumor invades to a depth of about 0.9          mm into the adjacent periductal, peripancreatic and          periduodenal fibroadipose tissue; the invasive tumor abuts both          pancreatic parenchyma and the duodenum but does not directly          invade the pancreas or duodenum.         There is extensive perineural invasion.         The hepatic duct margin is involved by invasive carcinoma but          the other margins are free of tumor, see  comment         Thirteen lymph nodes showing no evidence of metastatic tumor          (0/13)  G. Antrum:         Benign portion of stomach showing prominent chronic gastritis.         Seven link-gastic lymph nodes showing no evidence of metastatic          tumor (0/7)    Synoptic Report for Carcinoma of Distal Extrahepatic Bile Ducts:           -Procedure: Pancreaticoduodenectomy (Whipple resection)         -Tumor Site: Common bile duct, extrapancreatic         -Tumor Size: 7.5 cm         -Histologic Type: Adenocarcinoma, biliary-type         -Histologic Grade: Moderately to poorly differentiated, grade          2-3         -Tumor Extension: Tumor invades beyond the wall of the bile duct         -Depth of Tumor Extension: 0.9 cm         -Margins:                 Hepatic duct margin: Focally involved by invasive          adenocarcinoma and intraepithelial severe dysplasia, see          comment.                   Pancreatic neck margin: Free of tumor by 2.2 cm                    Uncinate margin: Free of tumor by 5.1 cm                    Retroperitoneal margin: Free of tumor by 1.6 cm                    Distal duodenal margin: Free of tumor by 1.5 cm         -Lymph-Vascular Invasion: Not identified         -Perineural Invasion: Present         -Regional Lymph Nodes:           Number of Lymph Nodes Involved: Zero           Number of Lymph Nodes Examined: 22         -Pathologic Staging:          Primary Tumor: *pT2          Regional Lymph Nodes: *pN0          Distant Metastasis: Not applicable.         -Additional Pathologic Findings: Not identified         -Ancillary Studies: Not performed     Procedures: 4/3/20     1.  Standard Whipple procedure with partial gastrectomy and BII gastrojejunostomy.  2.  Lymph node dissection of the celiac and portal regions.  3.  Omental flap to the pancreaticojejunostomy.  4.  Intraoperative ultrasound survey of the pancreas, liver, bile ducts.  5.  Liver wedge resection segment  5.          Diagnosis:     1. Cholangiocarcinoma of biliary tract (HCC)     2. Pancreatic mass     3. Abdominal pain, unspecified abdominal location             Medical Decision Making:  Today's Assessment / Status / Plan:     In light of the present findings, the patient will undergo repeat surveillance CT in 3 months.  Once this has been completed she will see me back in the office.    I, Dr. Perez have entered, reviewed and confirmed the above diagnoses related to this patient on this date of service, 7/28/2020  8:30 AM.    She agreed and verbalized her agreement and understanding with the current plan. I answered all questions and concerns she has at this time and advised her to call at any time in the interim with questions or concerns in regards to her care.    Thank you for allowing me to participate in her care, I will continue to follow closely.       Please note that this dictation was created using voice recognition software. I have made every reasonable attempt to correct obvious errors, but I expect that there are errors of grammar and possibly content that I did not discover before finalizing the note.     Thank you for this consultation and allowing me to participate in your patient's care. If I can be of further service please contact my office.

## 2020-07-28 ENCOUNTER — OFFICE VISIT (OUTPATIENT)
Dept: SURGICAL ONCOLOGY | Facility: MEDICAL CENTER | Age: 70
End: 2020-07-28
Payer: COMMERCIAL

## 2020-07-28 VITALS
DIASTOLIC BLOOD PRESSURE: 72 MMHG | HEIGHT: 64 IN | OXYGEN SATURATION: 97 % | WEIGHT: 137 LBS | BODY MASS INDEX: 23.39 KG/M2 | TEMPERATURE: 97.1 F | HEART RATE: 83 BPM | SYSTOLIC BLOOD PRESSURE: 128 MMHG

## 2020-07-28 DIAGNOSIS — C22.1 CHOLANGIOCARCINOMA OF BILIARY TRACT (HCC): ICD-10-CM

## 2020-07-28 DIAGNOSIS — R10.9 ABDOMINAL PAIN, UNSPECIFIED ABDOMINAL LOCATION: ICD-10-CM

## 2020-07-28 DIAGNOSIS — K86.89 PANCREATIC MASS: ICD-10-CM

## 2020-07-28 PROCEDURE — 99215 OFFICE O/P EST HI 40 MIN: CPT | Performed by: SURGERY

## 2020-07-28 ASSESSMENT — FIBROSIS 4 INDEX: FIB4 SCORE: 1.62

## 2020-07-28 NOTE — PROGRESS NOTES
Subjective:   7/28/2020  8:30 AM  Primary care physician:Syeda Andrews M.D.  Radiation oncology:{[facility] [place] RAD ONC PROVIDER:[id number]}  Referring Provider: ***  Medical Oncologist: ***    Chief Complaint:   Chief Complaint   Patient presents with   • Follow-Up     [facility] CHOLANGIOCARCINOMA     Diagnosis:   1. Cholangiocarcinoma of biliary tract (HCC)     2. Pancreatic mass     3. Abdominal pain, unspecified abdominal location         History of presenting illness:  Silvio Cooney  is a pleasant 70 y.o. year old female who presented with ***      Past Medical History:   Diagnosis Date   • Anesthesia     PONV   • Arthritis     knees - osteoarthritis   • Cataract     bilat IOL    • Heart valve disease     mitral valve leak   • High cholesterol    • Hypertension    • Pain     abd pain     Past Surgical History:   Procedure Laterality Date   • WHIPPLE PROCEDURE  4/3/2020    Procedure: WHIPPLE PROCEDURE- OMENTAL FLAP, INTRAOPERATIVE ULTRASOUND;  Surgeon: Corwin Perez M.D.;  Location: SURGERY [facility];  Service: General   • NODE DISSECTION  4/3/2020    Procedure: LYMPHADENECTOMY;  Surgeon: Corwin Perez M.D.;  Location: SURGERY [facility];  Service: General   • CHOLECYSTECTOMY  4/3/2020    Procedure: CHOLECYSTECTOMY;  Surgeon: Corwin Perez M.D.;  Location: SURGERY [facility];  Service: General   • BUNIONECTOMY  1990   • TONSILLECTOMY  1972   • OOPHORECTOMY Left 1968   • APPENDECTOMY  1968   • CATARACT EXTRACTION WITH IOL Bilateral      No Known Allergies  Outpatient Encounter Medications as of 7/28/2020   Medication Sig Dispense Refill   • celecoxib (CELEBREX) 200 MG Cap Take 1 Cap by mouth every day. 60 Cap 1   • metoclopramide (REGLAN) 10 MG Tab Take 1 Tab by mouth every 6 hours. 60 Tab 1   • omeprazole (PRILOSEC) 20 MG delayed-release capsule Take 1 Cap by mouth every day. 30 Cap 11   • lisinopril (PRINIVIL) 20 MG Tab Take 20 mg by mouth every evening.     • metoprolol  SR (TOPROL XL) 25 MG TABLET SR 24 HR Take 25 mg by mouth every day.     • simvastatin (ZOCOR) 40 MG Tab Take 40 mg by mouth every evening.     • lisinopril-hydrochlorothiazide (PRINZIDE) 20-25 MG per tablet Take 1 Tab by mouth every day.       No facility-administered encounter medications on file as of 2020.      Social History     Socioeconomic History   • Marital status:      Spouse name: Not on file   • Number of children: Not on file   • Years of education: Not on file   • Highest education level: Not on file   Occupational History   • Not on file   Social Needs   • Financial resource strain: Not on file   • Food insecurity     Worry: Not on file     Inability: Not on file   • Transportation needs     Medical: Not on file     Non-medical: Not on file   Tobacco Use   • Smoking status: Former Smoker     Packs/day: 1.00     Years: 20.00     Pack years: 20.00     Types: Cigarettes     Last attempt to quit:      Years since quittin.5   • Smokeless tobacco: Never Used   Substance and Sexual Activity   • Alcohol use: Not Currently   • Drug use: Not Currently   • Sexual activity: Not on file   Lifestyle   • Physical activity     Days per week: Not on file     Minutes per session: Not on file   • Stress: Not on file   Relationships   • Social connections     Talks on phone: Not on file     Gets together: Not on file     Attends Nondenominational service: Not on file     Active member of club or organization: Not on file     Attends meetings of clubs or organizations: Not on file     Relationship status: Not on file   • Intimate partner violence     Fear of current or ex partner: Not on file     Emotionally abused: Not on file     Physically abused: Not on file     Forced sexual activity: Not on file   Other Topics Concern   • Not on file   Social History Narrative   • Not on file      Social History     Tobacco Use   Smoking Status Former Smoker   • Packs/day: 1.00   • Years: 20.00   • Pack years: 20.00   •  "Types: Cigarettes   • Last attempt to quit:    • Years since quittin.5   Smokeless Tobacco Never Used     Social History     Substance and Sexual Activity   Alcohol Use Not Currently     Social History     Substance and Sexual Activity   Drug Use Not Currently        History reviewed. No pertinent family history.  ***  Review of Systems   All other systems reviewed and are negative.       Objective:   /72 (BP Location: Left arm, Patient Position: Sitting, BP Cuff Size: Adult)   Pulse 83   Temp 36.2 °C (97.1 °F) (Temporal)   Ht 1.626 m (5' 4\")   Wt 62.1 kg (137 lb)   SpO2 97%   BMI 23.52 kg/m²     Physical Exam    Labs:  *** ADD LABS FROM RESULT REVIEW ***    Imaging:  Per my read, ***    Pathology:  ***    Procedures:  ***    Diagnosis:     1. Cholangiocarcinoma of biliary tract (HCC)     2. Pancreatic mass     3. Abdominal pain, unspecified abdominal location             Medical Decision Making:  Today's Assessment / Status / Plan:     ***    "

## 2020-07-28 NOTE — PATIENT INSTRUCTIONS
The patient will undergo repeat pancreas protocol CT scan in 3 months at [facility].  I will see her once this is completed.

## 2020-10-26 NOTE — PROGRESS NOTES
Subjective:   10/27/2020  7:32 AM  Primary care physician:Syeda Andrews M.D.  Radiation oncology:Fayd Grace MD   Referring Provider:  Syeda Andrews MD  Other: Lyle Galicia MD  Medical Oncology: Real Araujo MD        Chief Complaint:   Chief Complaint   Patient presents with   • Follow-Up     [facility] CT CHOLANGIOCARCINOMA     Diagnosis:   1. Cholangiocarcinoma of biliary tract (HCC)     2. Pancreatic mass         History of presenting illness:  Silvio Cooney  is a pleasant 70 y.o. year old female who presented with surveillance status post Whipple procedure for cholangiocarcinoma.  Initially, frozen sections of the remnant portion of the bile duct it was negative but on the specimen in margin was positive microscopic lymph disease.  She underwent chemoradiotherapy without any difficulty and is presently on Xeloda and will be finishing that off in the next couple weeks.  She denies any fever or chills, nausea or vomiting.  She has been doing well.  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  She does complain of some gas and some soft stools.  I personally reviewed the imaging studies from [place] from October 21, 2020.  There is no sign of recurrent disease or new disease.  No adenopathy.      Past Medical History:   Diagnosis Date   • Anesthesia     PONV   • Arthritis     knees - osteoarthritis   • Cataract     bilat IOL    • Heart valve disease     mitral valve leak   • High cholesterol    • Hypertension    • Pain     abd pain     Past Surgical History:   Procedure Laterality Date   • WHIPPLE PROCEDURE  4/3/2020    Procedure: WHIPPLE PROCEDURE- OMENTAL FLAP, INTRAOPERATIVE ULTRASOUND;  Surgeon: Corwin Perez M.D.;  Location: SURGERY [facility];  Service: General   • NODE DISSECTION  4/3/2020    Procedure: LYMPHADENECTOMY;  Surgeon: Corwin Perez M.D.;  Location: SURGERY [facility];  Service: General   • CHOLECYSTECTOMY  4/3/2020     Procedure: CHOLECYSTECTOMY;  Surgeon: Corwin Perez M.D.;  Location: SURGERY [facility];  Service: General   • BUNIONECTOMY     • TONSILLECTOMY     • OOPHORECTOMY Left    • APPENDECTOMY     • CATARACT EXTRACTION WITH IOL Bilateral      No Known Allergies  Outpatient Encounter Medications as of 10/27/2020   Medication Sig Dispense Refill   • celecoxib (CELEBREX) 200 MG Cap Take 1 Cap by mouth every day. 60 Cap 1   • metoclopramide (REGLAN) 10 MG Tab Take 1 Tab by mouth every 6 hours. 60 Tab 1   • omeprazole (PRILOSEC) 20 MG delayed-release capsule Take 1 Cap by mouth every day. 30 Cap 11   • lisinopril (PRINIVIL) 20 MG Tab Take 20 mg by mouth every evening.     • metoprolol SR (TOPROL XL) 25 MG TABLET SR 24 HR Take 25 mg by mouth every day.     • simvastatin (ZOCOR) 40 MG Tab Take 40 mg by mouth every evening.     • lisinopril-hydrochlorothiazide (PRINZIDE) 20-25 MG per tablet Take 1 Tab by mouth every day.       No facility-administered encounter medications on file as of 10/27/2020.      Social History     Socioeconomic History   • Marital status:      Spouse name: Not on file   • Number of children: Not on file   • Years of education: Not on file   • Highest education level: Not on file   Occupational History   • Not on file   Social Needs   • Financial resource strain: Not on file   • Food insecurity     Worry: Not on file     Inability: Not on file   • Transportation needs     Medical: Not on file     Non-medical: Not on file   Tobacco Use   • Smoking status: Former Smoker     Packs/day: 1.00     Years: 20.00     Pack years: 20.00     Types: Cigarettes     Quit date:      Years since quittin.8   • Smokeless tobacco: Never Used   Substance and Sexual Activity   • Alcohol use: Not Currently   • Drug use: Not Currently   • Sexual activity: Not on file   Lifestyle   • Physical activity     Days per week: Not on file     Minutes per session: Not on file   • Stress: Not  "on file   Relationships   • Social connections     Talks on phone: Not on file     Gets together: Not on file     Attends Taoist service: Not on file     Active member of club or organization: Not on file     Attends meetings of clubs or organizations: Not on file     Relationship status: Not on file   • Intimate partner violence     Fear of current or ex partner: Not on file     Emotionally abused: Not on file     Physically abused: Not on file     Forced sexual activity: Not on file   Other Topics Concern   • Not on file   Social History Narrative   • Not on file      Social History     Tobacco Use   Smoking Status Former Smoker   • Packs/day: 1.00   • Years: 20.00   • Pack years: 20.00   • Types: Cigarettes   • Quit date:    • Years since quittin.8   Smokeless Tobacco Never Used     Social History     Substance and Sexual Activity   Alcohol Use Not Currently     Social History     Substance and Sexual Activity   Drug Use Not Currently        No family history on file.  No pertinent family history on file    Review of Systems   All other systems reviewed and are negative.       Objective:   /64 (BP Location: Right arm, Patient Position: Sitting, BP Cuff Size: Adult)   Pulse 83   Temp 36.4 °C (97.6 °F) (Temporal)   Ht 1.626 m (5' 4\")   Wt 61.7 kg (136 lb)   SpO2 97%   BMI 23.34 kg/m²     Physical Exam   Constitutional: She is oriented to person, place, and time. She appears well-developed and well-nourished.   HENT:   Head: Normocephalic and atraumatic.   Eyes: Pupils are equal, round, and reactive to light. Conjunctivae are normal.   Neck: Neck supple.   Cardiovascular: Normal rate and regular rhythm.   Pulmonary/Chest: Effort normal and breath sounds normal.   Abdominal: Soft. Bowel sounds are normal.   Incision is clean and dry   Musculoskeletal: Normal range of motion.   Neurological: She is alert and oriented to person, place, and time.   Skin: Skin is warm and dry.   Psychiatric: She " has a normal mood and affect. Her behavior is normal. Judgment and thought content normal.   Nursing note and vitals reviewed.      Labs:  Results for RASHEEDA LYNCH (MRN [id number]) as of 10/26/2020 10:20   Ref. Range 4/11/2020 01:06   WBC Latest Ref Range: 4.8 - 10.8 K/uL 8.8   RBC Latest Ref Range: 4.20 - 5.40 M/uL 3.53 (L)   Hemoglobin Latest Ref Range: 12.0 - 16.0 g/dL 11.1 (L)   Hematocrit Latest Ref Range: 37.0 - 47.0 % 32.6 (L)   MCV Latest Ref Range: 81.4 - 97.8 fL 92.4   MCH Latest Ref Range: 27.0 - 33.0 pg 31.4   MCHC Latest Ref Range: 33.6 - 35.0 g/dL 34.0   RDW Latest Ref Range: 35.9 - 50.0 fL 44.7   Platelet Count Latest Ref Range: 164 - 446 K/uL 291   MPV Latest Ref Range: 9.0 - 12.9 fL 9.7   Neutrophils-Polys Latest Ref Range: 44.00 - 72.00 % 82.50 (H)   Neutrophils (Absolute) Latest Ref Range: 2.00 - 7.15 K/uL 7.27 (H)   Lymphocytes Latest Ref Range: 22.00 - 41.00 % 7.00 (L)   Lymphs (Absolute) Latest Ref Range: 1.00 - 4.80 K/uL 0.62 (L)   Monocytes Latest Ref Range: 0.00 - 13.40 % 8.20   Monos (Absolute) Latest Ref Range: 0.00 - 0.85 K/uL 0.72   Eosinophils Latest Ref Range: 0.00 - 6.90 % 1.00   Eos (Absolute) Latest Ref Range: 0.00 - 0.51 K/uL 0.09   Basophils Latest Ref Range: 0.00 - 1.80 % 0.60   Baso (Absolute) Latest Ref Range: 0.00 - 0.12 K/uL 0.05   Immature Granulocytes Latest Ref Range: 0.00 - 0.90 % 0.70   Immature Granulocytes (abs) Latest Ref Range: 0.00 - 0.11 K/uL 0.06   Nucleated RBC Latest Units: /100 WBC 0.00   NRBC (Absolute) Latest Units: K/uL 0.00   Sodium Latest Ref Range: 135 - 145 mmol/L 136   Potassium Latest Ref Range: 3.6 - 5.5 mmol/L 4.3   Chloride Latest Ref Range: 96 - 112 mmol/L 104   Co2 Latest Ref Range: 20 - 33 mmol/L 13 (L)   Anion Gap Latest Ref Range: 7.0 - 16.0  19.0 (H)   Glucose Latest Ref Range: 65 - 99 mg/dL 97   Bun Latest Ref Range: 8 - 22 mg/dL 3 (L)   Creatinine Latest Ref Range: 0.50 - 1.40 mg/dL 0.43 (L)   GFR If  Latest Ref Range:  >60 mL/min/1.73 m 2 >60   GFR If Non  Latest Ref Range: >60 mL/min/1.73 m 2 >60   Calcium Latest Ref Range: 8.5 - 10.5 mg/dL 8.2 (L)   AST(SGOT) Latest Ref Range: 12 - 45 U/L 46 (H)   ALT(SGPT) Latest Ref Range: 2 - 50 U/L 48   Alkaline Phosphatase Latest Ref Range: 30 - 99 U/L 62   Total Bilirubin Latest Ref Range: 0.1 - 1.5 mg/dL 0.4   Albumin Latest Ref Range: 3.2 - 4.9 g/dL 2.9 (L)   Total Protein Latest Ref Range: 6.0 - 8.2 g/dL 5.5 (L)   Globulin Latest Ref Range: 1.9 - 3.5 g/dL 2.6   A-G Ratio Latest Units: g/dL 1.1       Imaging: 10/21/20 CT [facility]   Per my read,         Pathology: 4/3/20     FINAL DIAGNOSIS:    A. Segment 5 liver nodule:         Benign bile duct hamartoma  B. Gallbladder:         Benign gallbladder showing chronic cholecystitis and          cholesterolosis  C. Celiac node:         Benign lymph node (0/1)  D. Common hepatic duct margin:         Hepatic duct margin showing a small focus of invasive          adenocarcinoma and focal intraepithelial severe dysplasia, see          comment.  E. Portal node:         Benign lymph node (0/1)  F. Pancreas:         Invasive moderately to poorly differentiated adenocarcinoma of          the common bile duct; the tumor invades to a depth of about 0.9          mm into the adjacent periductal, peripancreatic and          periduodenal fibroadipose tissue; the invasive tumor abuts both          pancreatic parenchyma and the duodenum but does not directly          invade the pancreas or duodenum.         There is extensive perineural invasion.         The hepatic duct margin is involved by invasive carcinoma but          the other margins are free of tumor, see comment         Thirteen lymph nodes showing no evidence of metastatic tumor          (0/13)  G. Antrum:         Benign portion of stomach showing prominent chronic gastritis.         Seven link-gastic lymph nodes showing no evidence of metastatic          tumor (0/7)    Synoptic Report  for Carcinoma of Distal Extrahepatic Bile Ducts:           -Procedure: Pancreaticoduodenectomy (Whipple resection)         -Tumor Site: Common bile duct, extrapancreatic         -Tumor Size: 7.5 cm         -Histologic Type: Adenocarcinoma, biliary-type         -Histologic Grade: Moderately to poorly differentiated, grade          2-3         -Tumor Extension: Tumor invades beyond the wall of the bile duct         -Depth of Tumor Extension: 0.9 cm         -Margins:                 Hepatic duct margin: Focally involved by invasive          adenocarcinoma and intraepithelial severe dysplasia, see          comment.                   Pancreatic neck margin: Free of tumor by 2.2 cm                    Uncinate margin: Free of tumor by 5.1 cm                    Retroperitoneal margin: Free of tumor by 1.6 cm                    Distal duodenal margin: Free of tumor by 1.5 cm         -Lymph-Vascular Invasion: Not identified         -Perineural Invasion: Present         -Regional Lymph Nodes:           Number of Lymph Nodes Involved: Zero           Number of Lymph Nodes Examined: 22         -Pathologic Staging:          Primary Tumor: *pT2          Regional Lymph Nodes: *pN0          Distant Metastasis: Not applicable.         -Additional Pathologic Findings: Not identified         -Ancillary Studies: Not performed     Procedures: 4/3/20     1.  Standard Whipple procedure with partial gastrectomy and BII gastrojejunostomy.  2.  Lymph node dissection of the celiac and portal regions.  3.  Omental flap to the pancreaticojejunostomy.  4.  Intraoperative ultrasound survey of the pancreas, liver, bile ducts.  5.  Liver wedge resection segment 5.          Diagnosis:     1. Cholangiocarcinoma of biliary tract (HCC)     2. Pancreatic mass             Medical Decision Making:  Today's Assessment / Status / Plan:     Findings, there is no sign of recurrent disease.  My recommendation is to proceed with repeat imaging in 3 months.  In  the meantime I have given her a sample box of Creon 36,000 lipase units, 24 pills to test to see if that helps with her gas and her soft stool.  She will call me if this changes the consistency of her stool.    I, Dr. Perez have entered, reviewed and confirmed the above diagnoses related to this patient on this date of service, 10/27/2020  7:32 AM.    She agreed and verbalized her agreement and understanding with the current plan. I answered all questions and concerns she has at this time and advised her to call at any time in the interim with questions or concerns in regards to her care.    Thank you for allowing me to participate in her care, I will continue to follow closely.       Please note that this dictation was created using voice recognition software. I have made every reasonable attempt to correct obvious errors, but I expect that there are errors of grammar and possibly content that I did not discover before finalizing the note.     Thank you for this consultation and allowing me to participate in your patient's care. If I can be of further service please contact my office.

## 2020-10-27 ENCOUNTER — OFFICE VISIT (OUTPATIENT)
Dept: SURGICAL ONCOLOGY | Facility: MEDICAL CENTER | Age: 70
End: 2020-10-27
Payer: COMMERCIAL

## 2020-10-27 VITALS
BODY MASS INDEX: 23.22 KG/M2 | OXYGEN SATURATION: 97 % | WEIGHT: 136 LBS | DIASTOLIC BLOOD PRESSURE: 64 MMHG | HEART RATE: 83 BPM | SYSTOLIC BLOOD PRESSURE: 122 MMHG | TEMPERATURE: 97.6 F | HEIGHT: 64 IN

## 2020-10-27 DIAGNOSIS — K86.89 PANCREATIC MASS: ICD-10-CM

## 2020-10-27 DIAGNOSIS — C22.1 CHOLANGIOCARCINOMA OF BILIARY TRACT (HCC): ICD-10-CM

## 2020-10-27 PROCEDURE — 99214 OFFICE O/P EST MOD 30 MIN: CPT | Performed by: SURGERY

## 2020-10-27 ASSESSMENT — FIBROSIS 4 INDEX: FIB4 SCORE: 1.62

## 2020-11-23 RX ORDER — PANCRELIPASE 36000; 180000; 114000 [USP'U]/1; [USP'U]/1; [USP'U]/1
2 CAPSULE, DELAYED RELEASE PELLETS ORAL
Qty: 250 CAP | Refills: 11 | Status: SHIPPED | OUTPATIENT
Start: 2020-11-23

## 2021-01-15 DIAGNOSIS — Z23 NEED FOR VACCINATION: ICD-10-CM

## 2021-01-25 NOTE — PROGRESS NOTES
Subjective:   1/26/2021  7:34 AM  Primary care physician:Syeda Andrews M.D.  Radiation oncology:Fady Grace MD   Referring Provider:  Syeda Andrews MD  Medical Oncologist: Real Araujo MD   Other: Lyle Galicia MD        Chief Complaint: No chief complaint on file.    Diagnosis:   1. Cholangiocarcinoma of biliary tract (HCC)     2. Pancreatic mass     3. Abdominal pain, unspecified abdominal location     4. IPMN (intraductal papillary mucinous neoplasm)         History of presenting illness:  Silvio Cooney  is a pleasant 70 y.o. year old female who presented with follow-up status post Whipple procedure for cholangiocarcinoma.  She did complete her chemoradiotherapy followed by chemotherapy back in mid December.  She is feeling much better.  Her main complaint is actually diarrhea and gas.  After discussing with her her situation it appears that she is not taking her Creon in the morning with her breakfast.  This most likely is the source of the above complaints.  Otherwise, she denies any weight loss or abdominal pain.  She is not jaundiced.  She did undergo a repeat CT scan on January 22, 2021 which I personally reviewed from [facility] and shows no sign of recurrent disease or metastatic disease.  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  Otherwise she is doing well.  She denies any fever or chills, nausea or vomiting.  She is in good spirits and has returned to work.  She did complete her chemotherapy mid December.      Past Medical History:   Diagnosis Date   • Anesthesia     PONV   • Arthritis     knees - osteoarthritis   • Cataract     bilat IOL    • Heart valve disease     mitral valve leak   • High cholesterol    • Hypertension    • Pain     abd pain     Past Surgical History:   Procedure Laterality Date   • WHIPPLE PROCEDURE  4/3/2020    Procedure: WHIPPLE PROCEDURE- OMENTAL FLAP,  INTRAOPERATIVE ULTRASOUND;  Surgeon: Corwin Perez M.D.;  Location: SURGERY [facility];  Service: General   • NODE DISSECTION  4/3/2020    Procedure: LYMPHADENECTOMY;  Surgeon: Corwin Perez M.D.;  Location: SURGERY [facility];  Service: General   • CHOLECYSTECTOMY  4/3/2020    Procedure: CHOLECYSTECTOMY;  Surgeon: Corwin Perez M.D.;  Location: SURGERY [facility];  Service: General   • BUNIONECTOMY  1990   • TONSILLECTOMY  1972   • OOPHORECTOMY Left 1968   • APPENDECTOMY  1968   • CATARACT EXTRACTION WITH IOL Bilateral      No Known Allergies  Outpatient Encounter Medications as of 1/26/2021   Medication Sig Dispense Refill   • potassium Chloride ER (K-TAB) 20 MEQ Tab CR tablet Take  by mouth every day. WITH FOOD     • Pancrelipase, Lip-Prot-Amyl, (CREON) 55982 units Cap DR Particles Take 2 Capsule by mouth 3 times a day before meals. 250 Cap 11   • celecoxib (CELEBREX) 200 MG Cap Take 1 Cap by mouth every day. 60 Cap 1   • metoclopramide (REGLAN) 10 MG Tab Take 1 Tab by mouth every 6 hours. 60 Tab 1   • omeprazole (PRILOSEC) 20 MG delayed-release capsule Take 1 Cap by mouth every day. 30 Cap 11   • lisinopril (PRINIVIL) 20 MG Tab Take 20 mg by mouth every evening.     • metoprolol SR (TOPROL XL) 25 MG TABLET SR 24 HR Take 25 mg by mouth every day.     • simvastatin (ZOCOR) 40 MG Tab Take 40 mg by mouth every evening.     • lisinopril-hydrochlorothiazide (PRINZIDE) 20-25 MG per tablet Take 1 Tab by mouth every day.       No facility-administered encounter medications on file as of 1/26/2021.      Social History     Socioeconomic History   • Marital status:      Spouse name: Not on file   • Number of children: Not on file   • Years of education: Not on file   • Highest education level: Not on file   Occupational History   • Not on file   Social Needs   • Financial resource strain: Not on file   • Food insecurity     Worry: Not on file     Inability: Not on  "file   • Transportation needs     Medical: Not on file     Non-medical: Not on file   Tobacco Use   • Smoking status: Former Smoker     Packs/day: 1.00     Years: 20.00     Pack years: 20.00     Types: Cigarettes     Quit date:      Years since quittin.0   • Smokeless tobacco: Never Used   Substance and Sexual Activity   • Alcohol use: Not Currently   • Drug use: Not Currently   • Sexual activity: Not on file   Lifestyle   • Physical activity     Days per week: Not on file     Minutes per session: Not on file   • Stress: Not on file   Relationships   • Social connections     Talks on phone: Not on file     Gets together: Not on file     Attends [employer] witness service: Not on file     Active member of club or organization: Not on file     Attends meetings of clubs or organizations: Not on file     Relationship status: Not on file   • Intimate partner violence     Fear of current or ex partner: Not on file     Emotionally abused: Not on file     Physically abused: Not on file     Forced sexual activity: Not on file   Other Topics Concern   • Not on file   Social History Narrative   • Not on file      Social History     Tobacco Use   Smoking Status Former Smoker   • Packs/day: 1.00   • Years: 20.00   • Pack years: 20.00   • Types: Cigarettes   • Quit date:    • Years since quittin.0   Smokeless Tobacco Never Used     Social History     Substance and Sexual Activity   Alcohol Use Not Currently     Social History     Substance and Sexual Activity   Drug Use Not Currently        No family history on file.  No pertinent family history on file    Review of Systems   Gastrointestinal: Positive for abdominal pain and diarrhea.   All other systems reviewed and are negative.       Objective:   /62 (BP Location: Right arm, Patient Position: Sitting, BP Cuff Size: Adult)   Pulse 96   Ht 1.651 m (5' 5\")   Wt 58.1 kg (128 lb)   SpO2 (!) 80%   BMI 21.30 kg/m²     Physical Exam   Constitutional: She is " oriented to person, place, and time. She appears well-developed and well-nourished.   HENT:   Head: Normocephalic and atraumatic.   Eyes: Pupils are equal, round, and reactive to light. Conjunctivae are normal.   Neck: Normal range of motion. Neck supple.   Cardiovascular: Normal rate and regular rhythm.   Pulmonary/Chest: Effort normal and breath sounds normal.   Abdominal: Soft. Bowel sounds are normal.   Her incision is clean, dry, and intact.   Musculoskeletal: Normal range of motion.   Neurological: She is alert and oriented to person, place, and time.   Skin: Skin is warm and dry.   Psychiatric: She has a normal mood and affect. Her behavior is normal. Judgment and thought content normal.   Nursing note and vitals reviewed.      Labs:  Results for RASHEEDA LYNCH (MRN [id number]) as of 1/25/2021 07:49   Ref. Range 4/11/2020 01:06   WBC Latest Ref Range: 4.8 - 10.8 K/uL 8.8   RBC Latest Ref Range: 4.20 - 5.40 M/uL 3.53 (L)   Hemoglobin Latest Ref Range: 12.0 - 16.0 g/dL 11.1 (L)   Hematocrit Latest Ref Range: 37.0 - 47.0 % 32.6 (L)   MCV Latest Ref Range: 81.4 - 97.8 fL 92.4   MCH Latest Ref Range: 27.0 - 33.0 pg 31.4   MCHC Latest Ref Range: 33.6 - 35.0 g/dL 34.0   RDW Latest Ref Range: 35.9 - 50.0 fL 44.7   Platelet Count Latest Ref Range: 164 - 446 K/uL 291   MPV Latest Ref Range: 9.0 - 12.9 fL 9.7   Neutrophils-Polys Latest Ref Range: 44.00 - 72.00 % 82.50 (H)   Neutrophils (Absolute) Latest Ref Range: 2.00 - 7.15 K/uL 7.27 (H)   Lymphocytes Latest Ref Range: 22.00 - 41.00 % 7.00 (L)   Lymphs (Absolute) Latest Ref Range: 1.00 - 4.80 K/uL 0.62 (L)   Monocytes Latest Ref Range: 0.00 - 13.40 % 8.20   Monos (Absolute) Latest Ref Range: 0.00 - 0.85 K/uL 0.72   Eosinophils Latest Ref Range: 0.00 - 6.90 % 1.00   Eos (Absolute) Latest Ref Range: 0.00 - 0.51 K/uL 0.09   Basophils Latest Ref Range: 0.00 - 1.80 % 0.60   Baso (Absolute) Latest Ref Range: 0.00 - 0.12 K/uL 0.05   Immature Granulocytes Latest Ref Range:  0.00 - 0.90 % 0.70   Immature Granulocytes (abs) Latest Ref Range: 0.00 - 0.11 K/uL 0.06   Nucleated RBC Latest Units: /100 WBC 0.00   NRBC (Absolute) Latest Units: K/uL 0.00   Sodium Latest Ref Range: 135 - 145 mmol/L 136   Potassium Latest Ref Range: 3.6 - 5.5 mmol/L 4.3   Chloride Latest Ref Range: 96 - 112 mmol/L 104   Co2 Latest Ref Range: 20 - 33 mmol/L 13 (L)   Anion Gap Latest Ref Range: 7.0 - 16.0  19.0 (H)   Glucose Latest Ref Range: 65 - 99 mg/dL 97   Bun Latest Ref Range: 8 - 22 mg/dL 3 (L)   Creatinine Latest Ref Range: 0.50 - 1.40 mg/dL 0.43 (L)   GFR If  Latest Ref Range: >60 mL/min/1.73 m 2 >60   GFR If Non  Latest Ref Range: >60 mL/min/1.73 m 2 >60   Calcium Latest Ref Range: 8.5 - 10.5 mg/dL 8.2 (L)   AST(SGOT) Latest Ref Range: 12 - 45 U/L 46 (H)   ALT(SGPT) Latest Ref Range: 2 - 50 U/L 48   Alkaline Phosphatase Latest Ref Range: 30 - 99 U/L 62   Total Bilirubin Latest Ref Range: 0.1 - 1.5 mg/dL 0.4   Albumin Latest Ref Range: 3.2 - 4.9 g/dL 2.9 (L)   Total Protein Latest Ref Range: 6.0 - 8.2 g/dL 5.5 (L)   Globulin Latest Ref Range: 1.9 - 3.5 g/dL 2.6   A-G Ratio Latest Units: g/dL 1.1       Imagin21 CT [facility]   Per my read,           Pathology: 4/3/20     FINAL DIAGNOSIS:    A. Segment 5 liver nodule:         Benign bile duct hamartoma  B. Gallbladder:         Benign gallbladder showing chronic cholecystitis and          cholesterolosis  C. Celiac node:         Benign lymph node (0/1)  D. Common hepatic duct margin:         Hepatic duct margin showing a small focus of invasive          adenocarcinoma and focal intraepithelial severe dysplasia, see          comment.  E. Portal node:         Benign lymph node (0/1)  F. Pancreas:         Invasive moderately to poorly differentiated adenocarcinoma of          the common bile duct; the tumor invades to a depth of about 0.9          mm into the adjacent periductal, peripancreatic and          periduodenal  fibroadipose tissue; the invasive tumor abuts both          pancreatic parenchyma and the duodenum but does not directly          invade the pancreas or duodenum.         There is extensive perineural invasion.         The hepatic duct margin is involved by invasive carcinoma but          the other margins are free of tumor, see comment         Thirteen lymph nodes showing no evidence of metastatic tumor          (0/13)  G. Antrum:         Benign portion of stomach showing prominent chronic gastritis.         Seven link-gastic lymph nodes showing no evidence of metastatic          tumor (0/7)    Synoptic Report for Carcinoma of Distal Extrahepatic Bile Ducts:           -Procedure: Pancreaticoduodenectomy (Whipple resection)         -Tumor Site: Common bile duct, extrapancreatic         -Tumor Size: 7.5 cm         -Histologic Type: Adenocarcinoma, biliary-type         -Histologic Grade: Moderately to poorly differentiated, grade          2-3         -Tumor Extension: Tumor invades beyond the wall of the bile duct         -Depth of Tumor Extension: 0.9 cm         -Margins:                 Hepatic duct margin: Focally involved by invasive          adenocarcinoma and intraepithelial severe dysplasia, see          comment.                   Pancreatic neck margin: Free of tumor by 2.2 cm                    Uncinate margin: Free of tumor by 5.1 cm                    Retroperitoneal margin: Free of tumor by 1.6 cm                    Distal duodenal margin: Free of tumor by 1.5 cm         -Lymph-Vascular Invasion: Not identified         -Perineural Invasion: Present         -Regional Lymph Nodes:           Number of Lymph Nodes Involved: Zero           Number of Lymph Nodes Examined: 22         -Pathologic Staging:          Primary Tumor: *pT2          Regional Lymph Nodes: *pN0          Distant Metastasis: Not applicable.         -Additional Pathologic Findings: Not identified         -Ancillary Studies: Not  performed     Procedures: 4/3/20     1.  Standard Whipple procedure with partial gastrectomy and BII gastrojejunostomy.  2.  Lymph node dissection of the celiac and portal regions.  3.  Omental flap to the pancreaticojejunostomy.  4.  Intraoperative ultrasound survey of the pancreas, liver, bile ducts.  5.  Liver wedge resection segment 5.       Diagnosis:     1. Cholangiocarcinoma of biliary tract (HCC)     2. Pancreatic mass     3. Abdominal pain, unspecified abdominal location     4. IPMN (intraductal papillary mucinous neoplasm)             Medical Decision Making:  Today's Assessment / Status / Plan:     In light of the present findings, the patient's CT scan is stable showing no sign of recurrent disease or metastatic disease.  As for the venous aneurysm that is intrahepatic, we would not do anything about it at this time since it is not causing any hepatic issues.  As for her diarrhea and gas admitted that she was not taking the Creon in the morning but only in lunch and dinner.  I recommended she take the Creon for all 3 meals 2 tablets for total of 72,000 lipase units with each meal.  Also 1 pill with each snack.  The meantime we will repeat her CT scan with pancreas protocol in 3 months in April 2021.  I put the order in we will see her at that time.    I, Dr. Perez have entered, reviewed and confirmed the above diagnoses related to this patient on this date of service, 1/26/2021  7:34 AM.    She agreed and verbalized her agreement and understanding with the current plan. I answered all questions and concerns she has at this time and advised her to call at any time in the interim with questions or concerns in regards to her care.    Thank you for allowing me to participate in her care, I will continue to follow closely.       Please note that this dictation was created using voice recognition software. I have made every reasonable attempt to correct obvious errors, but I expect that there are errors  of grammar and possibly content that I did not discover before finalizing the note.     Thank you for this consultation and allowing me to participate in your patient's care. If I can be of further service please contact my office.

## 2021-01-26 ENCOUNTER — OFFICE VISIT (OUTPATIENT)
Dept: SURGICAL ONCOLOGY | Facility: MEDICAL CENTER | Age: 71
End: 2021-01-26
Payer: COMMERCIAL

## 2021-01-26 VITALS
DIASTOLIC BLOOD PRESSURE: 62 MMHG | BODY MASS INDEX: 21.33 KG/M2 | OXYGEN SATURATION: 80 % | HEIGHT: 65 IN | HEART RATE: 96 BPM | SYSTOLIC BLOOD PRESSURE: 128 MMHG | WEIGHT: 128 LBS

## 2021-01-26 DIAGNOSIS — C22.1 CHOLANGIOCARCINOMA OF BILIARY TRACT (HCC): ICD-10-CM

## 2021-01-26 DIAGNOSIS — R10.9 ABDOMINAL PAIN, UNSPECIFIED ABDOMINAL LOCATION: ICD-10-CM

## 2021-01-26 DIAGNOSIS — K86.89 PANCREATIC MASS: ICD-10-CM

## 2021-01-26 DIAGNOSIS — D49.0 IPMN (INTRADUCTAL PAPILLARY MUCINOUS NEOPLASM): ICD-10-CM

## 2021-01-26 PROCEDURE — 99215 OFFICE O/P EST HI 40 MIN: CPT | Performed by: SURGERY

## 2021-01-26 RX ORDER — POTASSIUM CHLORIDE 1500 MG/1
TABLET, EXTENDED RELEASE ORAL DAILY
COMMUNITY
Start: 2021-01-16

## 2021-01-26 ASSESSMENT — ENCOUNTER SYMPTOMS
ABDOMINAL PAIN: 1
DIARRHEA: 1

## 2021-01-26 ASSESSMENT — FIBROSIS 4 INDEX: FIB4 SCORE: 1.62

## 2021-05-12 NOTE — PROGRESS NOTES
Subjective:   5/18/2021  7:27 AM  Primary care physician:Syeda Andrews M.D.  Radiation oncology:Dagoberto Grace  Referring Provider: Syeda Andrews MD  Medical Oncologist: Real Araujo MD   Other: Lyle Galicia MD       Chief Complaint:   Chief Complaint   Patient presents with   • Follow-Up     [facility] CT CHOLANGIOCARCINOMA      Diagnosis:   1. Cholangiocarcinoma of biliary tract (HCC)     2. IPMN (intraductal papillary mucinous neoplasm)     3. Pancreatic cyst         History of presenting illness:  Silvio Cooney  is a pleasant 70 y.o. year old female who presented with what appears to be recurrence from a cholangiocarcinoma that was resected back in April 2020.  The patient did receive adjuvant chemotherapy and radiation because on postop final pathology there was an R1 resection at the bile duct margin.  She has been doing well and followed closely until she developed a mass up in the upper midline.  CT scan showed a mass she underwent a PET scan which I have personally reviewed from [place] which revealed the upper midline incision metastasis and then a secondary right lower quadrant metastasis.  There is no sign of recurrent disease in the martín or the area of resection.  The patient did undergo biopsy and it did come back positive from the right lower quadrant near the vaginal cuff positivity consistent with cholangiocarcinoma.  I have spoken to Dr. Godoy as well as Dr. Araujo during this visitff to discuss the plan.  She has some pain in the right lower quadrant other than that she has no symptoms.  The upper midline mass is palpable.      Past Medical History:   Diagnosis Date   • Anesthesia     PONV   • Arthritis     knees - osteoarthritis   • Cataract     bilat IOL    • Heart valve disease     mitral valve leak   • High cholesterol    • Hypertension    • Pain     abd pain     Past Surgical History:   Procedure Laterality Date   • WHIPPLE PROCEDURE  4/3/2020    Procedure: WHIPPLE PROCEDURE- OMENTAL FLAP,  INTRAOPERATIVE ULTRASOUND;  Surgeon: Corwin Perez M.D.;  Location: SURGERY [facility];  Service: General   • NODE DISSECTION  4/3/2020    Procedure: LYMPHADENECTOMY;  Surgeon: Corwin Perez M.D.;  Location: SURGERY [facility];  Service: General   • CHOLECYSTECTOMY  4/3/2020    Procedure: CHOLECYSTECTOMY;  Surgeon: Corwin Perez M.D.;  Location: SURGERY [facility];  Service: General   • BUNIONECTOMY  1990   • TONSILLECTOMY  1972   • OOPHORECTOMY Left 1968   • APPENDECTOMY  1968   • CATARACT EXTRACTION WITH IOL Bilateral      No Known Allergies  Outpatient Encounter Medications as of 5/18/2021   Medication Sig Dispense Refill   • celecoxib (CELEBREX) 200 MG Cap Take 1 Cap by mouth every day. 60 Cap 1   • oxyCODONE-acetaminophen (PERCOCET) 5-325 MG Tab TAKE 1 TABLET BY MOUTH EVERY 4-6 HOURS AS NEEDED CANCER RELATED FOR PAIN.     • potassium Chloride ER (K-TAB) 20 MEQ Tab CR tablet Take  by mouth every day. WITH FOOD     • Pancrelipase, Lip-Prot-Amyl, (CREON) 43657 units Cap DR Particles Take 2 Capsule by mouth 3 times a day before meals. (Patient not taking: Reported on 5/18/2021) 250 Cap 11   • metoclopramide (REGLAN) 10 MG Tab Take 1 Tab by mouth every 6 hours. 60 Tab 1   • omeprazole (PRILOSEC) 20 MG delayed-release capsule Take 1 Cap by mouth every day. 30 Cap 11   • lisinopril (PRINIVIL) 20 MG Tab Take 20 mg by mouth every evening.     • metoprolol SR (TOPROL XL) 25 MG TABLET SR 24 HR Take 25 mg by mouth every day.     • simvastatin (ZOCOR) 40 MG Tab Take 40 mg by mouth every evening.     • lisinopril-hydrochlorothiazide (PRINZIDE) 20-25 MG per tablet Take 1 Tab by mouth every day.       No facility-administered encounter medications on file as of 5/18/2021.     Social History     Socioeconomic History   • Marital status:      Spouse name: Not on file   • Number of children: Not on file   • Years of education: Not on file   • Highest education level: Not  on file   Occupational History   • Not on file   Tobacco Use   • Smoking status: Former Smoker     Packs/day: 1.00     Years: 20.00     Pack years: 20.00     Types: Cigarettes     Quit date:      Years since quittin.3   • Smokeless tobacco: Never Used   Substance and Sexual Activity   • Alcohol use: Not Currently   • Drug use: Not Currently   • Sexual activity: Not on file   Other Topics Concern   • Not on file   Social History Narrative   • Not on file     Social Determinants of Health     Financial Resource Strain:    • Difficulty of Paying Living Expenses:    Food Insecurity:    • Worried About Running Out of Food in the Last Year:    • Ran Out of Food in the Last Year:    Transportation Needs:    • Lack of Transportation (Medical):    • Lack of Transportation (Non-Medical):    Physical Activity:    • Days of Exercise per Week:    • Minutes of Exercise per Session:    Stress:    • Feeling of Stress :    Social Connections:    • Frequency of Communication with Friends and Family:    • Frequency of Social Gatherings with Friends and Family:    • Attends [employer] Services:    • Active Member of Clubs or Organizations:    • Attends Club or Organization Meetings:    • Marital Status:    Intimate Partner Violence:    • Fear of Current or Ex-Partner:    • Emotionally Abused:    • Physically Abused:    • Sexually Abused:       Social History     Tobacco Use   Smoking Status Former Smoker   • Packs/day: 1.00   • Years: 20.00   • Pack years: 20.00   • Types: Cigarettes   • Quit date:    • Years since quittin.3   Smokeless Tobacco Never Used     Social History     Substance and Sexual Activity   Alcohol Use Not Currently     Social History     Substance and Sexual Activity   Drug Use Not Currently        No family history on file.  No pertinent family history on file    Review of Systems   Constitutional: Positive for weight loss.   Gastrointestinal: Positive for abdominal pain and constipation.   All  "other systems reviewed and are negative.       Objective:   /62 (BP Location: Left arm, Patient Position: Sitting, BP Cuff Size: Adult)   Pulse (!) 101   Temp 36.1 °C (97 °F) (Temporal)   Ht 1.626 m (5' 4\")   Wt 47.6 kg (105 lb)   SpO2 97%   BMI 18.02 kg/m²     Physical Exam  Vitals and nursing note reviewed.   HENT:      Head: Normocephalic and atraumatic.      Nose: Nose normal.      Mouth/Throat:      Mouth: Mucous membranes are dry.   Eyes:      Extraocular Movements: Extraocular movements intact.      Pupils: Pupils are equal, round, and reactive to light.   Abdominal:      General: Abdomen is flat.      Palpations: Abdomen is soft.      Tenderness: There is abdominal tenderness.      Comments: She does have discomfort in the right lower quadrant from the intraperitoneal tumor near the vaginal cuff but no pain up in the midline mass.  It is palpable and it does measure approximately 3 cm in the upper midline incision.  There are no other masses appreciated.   Musculoskeletal:         General: Normal range of motion.   Skin:     General: Skin is warm and dry.   Neurological:      General: No focal deficit present.      Mental Status: She is alert and oriented to person, place, and time.   Psychiatric:         Mood and Affect: Mood normal.         Behavior: Behavior normal.         Thought Content: Thought content normal.         Judgment: Judgment normal.         Labs:  NA    Imaging: CT 4/21/21 [facility]   Per my read,         Pathology:  4/3/20     FINAL DIAGNOSIS:    A. Segment 5 liver nodule:         Benign bile duct hamartoma  B. Gallbladder:         Benign gallbladder showing chronic cholecystitis and          cholesterolosis  C. Celiac node:         Benign lymph node (0/1)  D. Common hepatic duct margin:         Hepatic duct margin showing a small focus of invasive          adenocarcinoma and focal intraepithelial severe dysplasia, see          comment.  E. Portal node:         Benign lymph node " (0/1)  F. Pancreas:         Invasive moderately to poorly differentiated adenocarcinoma of          the common bile duct; the tumor invades to a depth of about 0.9          mm into the adjacent periductal, peripancreatic and          periduodenal fibroadipose tissue; the invasive tumor abuts both          pancreatic parenchyma and the duodenum but does not directly          invade the pancreas or duodenum.         There is extensive perineural invasion.         The hepatic duct margin is involved by invasive carcinoma but          the other margins are free of tumor, see comment         Thirteen lymph nodes showing no evidence of metastatic tumor          (0/13)  G. Antrum:         Benign portion of stomach showing prominent chronic gastritis.         Seven link-gastic lymph nodes showing no evidence of metastatic          tumor (0/7)    Synoptic Report for Carcinoma of Distal Extrahepatic Bile Ducts:           -Procedure: Pancreaticoduodenectomy (Whipple resection)         -Tumor Site: Common bile duct, extrapancreatic         -Tumor Size: 7.5 cm         -Histologic Type: Adenocarcinoma, biliary-type         -Histologic Grade: Moderately to poorly differentiated, grade          2-3         -Tumor Extension: Tumor invades beyond the wall of the bile duct         -Depth of Tumor Extension: 0.9 cm         -Margins:                 Hepatic duct margin: Focally involved by invasive          adenocarcinoma and intraepithelial severe dysplasia, see          comment.                   Pancreatic neck margin: Free of tumor by 2.2 cm                    Uncinate margin: Free of tumor by 5.1 cm                    Retroperitoneal margin: Free of tumor by 1.6 cm                    Distal duodenal margin: Free of tumor by 1.5 cm         -Lymph-Vascular Invasion: Not identified         -Perineural Invasion: Present         -Regional Lymph Nodes:           Number of Lymph Nodes Involved: Zero           Number of Lymph Nodes  Examined: 22         -Pathologic Staging:          Primary Tumor: *pT2          Regional Lymph Nodes: *pN0          Distant Metastasis: Not applicable.         -Additional Pathologic Findings: Not identified         -Ancillary Studies: Not performed     Procedures: 4/3/20     1.  Standard Whipple procedure with partial gastrectomy and BII gastrojejunostomy.  2.  Lymph node dissection of the celiac and portal regions.  3.  Omental flap to the pancreaticojejunostomy.  4.  Intraoperative ultrasound survey of the pancreas, liver, bile ducts.  5.  Liver wedge resection segment 5.          Diagnosis:     1. Cholangiocarcinoma of biliary tract (HCC)     2. IPMN (intraductal papillary mucinous neoplasm)     3. Pancreatic cyst       In light of      Medical Decision Making:  Today's Assessment / Status / Plan:     The present findings, the patient was found to have 2 metastasis 1 in the upper midline measuring approximately 3 cm which is palpable and another one approximately 2 cm in the right lower quadrant.  The pathology is positive for consistency with cholangiocarcinoma and it is also confirmed via PET scan.  I recommend and agree with Dr. Araujo to proceed with chemotherapy upfront to see how it responds.  If the patient needs a PowerPort we can place it.  In the meantime I have reached out to medical oncology and radiation oncology to discuss the plan.  I will see her at the next set of imaging studies.    I, Dr. Perez have entered, reviewed and confirmed the above diagnoses related to this patient on this date of service, 5/18/2021  7:27 AM.    She agreed and verbalized her agreement and understanding with the current plan. I answered all questions and concerns she has at this time and advised her to call at any time in the interim with questions or concerns in regards to her care.    Thank you for allowing me to participate in her care, I will continue to follow closely.       Please note that this dictation  was created using voice recognition software. I have made every reasonable attempt to correct obvious errors, but I expect that there are errors of grammar and possibly content that I did not discover before finalizing the note.     Thank you for this consultation and allowing me to participate in your patient's care. If I can be of further service please contact my office.

## 2021-05-18 ENCOUNTER — OFFICE VISIT (OUTPATIENT)
Dept: SURGICAL ONCOLOGY | Facility: MEDICAL CENTER | Age: 71
End: 2021-05-18
Payer: COMMERCIAL

## 2021-05-18 VITALS
HEIGHT: 64 IN | SYSTOLIC BLOOD PRESSURE: 114 MMHG | OXYGEN SATURATION: 97 % | WEIGHT: 105 LBS | TEMPERATURE: 97 F | DIASTOLIC BLOOD PRESSURE: 62 MMHG | HEART RATE: 101 BPM | BODY MASS INDEX: 17.93 KG/M2

## 2021-05-18 DIAGNOSIS — D49.0 IPMN (INTRADUCTAL PAPILLARY MUCINOUS NEOPLASM): ICD-10-CM

## 2021-05-18 DIAGNOSIS — C22.1 METASTATIC CHOLANGIOCARCINOMA TO BILE DUCT (HCC): ICD-10-CM

## 2021-05-18 DIAGNOSIS — K86.2 PANCREATIC CYST: ICD-10-CM

## 2021-05-18 DIAGNOSIS — C22.1 CHOLANGIOCARCINOMA OF BILIARY TRACT (HCC): ICD-10-CM

## 2021-05-18 DIAGNOSIS — C78.89 METASTATIC CHOLANGIOCARCINOMA TO BILE DUCT (HCC): ICD-10-CM

## 2021-05-18 PROCEDURE — 99215 OFFICE O/P EST HI 40 MIN: CPT | Performed by: SURGERY

## 2021-05-18 RX ORDER — OXYCODONE HYDROCHLORIDE AND ACETAMINOPHEN 5; 325 MG/1; MG/1
TABLET ORAL
COMMUNITY
Start: 2021-04-30

## 2021-05-18 ASSESSMENT — ENCOUNTER SYMPTOMS
WEIGHT LOSS: 1
CONSTIPATION: 1
ABDOMINAL PAIN: 1

## 2021-05-18 ASSESSMENT — FIBROSIS 4 INDEX: FIB4 SCORE: 1.62

## 2021-05-18 NOTE — PATIENT INSTRUCTIONS
The patient will go for chemotherapy upfront and we will reassess her and restage her at that time once completed.

## (undated) DEVICE — ELECTRODE DUAL RETURN W/ CORD - (50/PK)

## (undated) DEVICE — CLIP MED LG INTNL HRZN TI ESCP - (20/BX)

## (undated) DEVICE — DRAPE LARGE 3 QUARTER - (20/CA)

## (undated) DEVICE — DRAPE MEDI-SLUSH STERILE  - (40/CA)

## (undated) DEVICE — SUTURE 4-0 PROLENE RB-1 D/A 36 (36PK/BX)"

## (undated) DEVICE — SUTURE 1 PDS II PLUS TP-1 - (12PK/BX)

## (undated) DEVICE — SPONGE LAP XR ST 36X36 LF - (1/PK40PK/CA)

## (undated) DEVICE — SUTURE 2-0 ETHILON FS - (36/BX) 18 INCH

## (undated) DEVICE — SET LEADWIRE 5 LEAD BEDSIDE DISPOSABLE ECG (1SET OF 5/EA)

## (undated) DEVICE — DRAPE IOBAN II INCISE 23X17 - (10EA/BX 4BX/CA)

## (undated) DEVICE — Device

## (undated) DEVICE — HEAD HOLDER JUNIOR/ADULT

## (undated) DEVICE — SPONGE DRAIN 4 X 4IN 6-PLY - (2/PK25PK/BX12BX/CS)

## (undated) DEVICE — DRESSING NON-ADHERING 8 X 3 - (50/BX)

## (undated) DEVICE — TRAY SURESTEP FOLEY TEMP SENSING 16FR (10EA/CA) ORDER  #18764 FOR TEMP FOLEY ONLY

## (undated) DEVICE — BOVIE BLADE COATED - (50/PK)

## (undated) DEVICE — SUTURE 4-0 PROLENE SH 36 (36PK/BX)"

## (undated) DEVICE — SUTURE 3-0 PROLENE SH 36 (36PK/BX)"

## (undated) DEVICE — ELECTRODE 850 FOAM ADHESIVE - HYDROGEL RADIOTRNSPRNT (50/PK)

## (undated) DEVICE — DRAPESURG STERI-DRAPE LONG - (10/BX 4BX/CA)

## (undated) DEVICE — SPONGE GAUZESTER. 2X2 4-PL - (2/PK 50PK/BX 30BX/CS)

## (undated) DEVICE — NEPTUNE 4 PORT MANIFOLD - (20/PK)

## (undated) DEVICE — SUTURE 2-0 SILK SH 30 IN C/R (12PK/BX)

## (undated) DEVICE — CANISTER SUCTION 3000ML MECHANICAL FILTER AUTO SHUTOFF MEDI-VAC NONSTERILE LF DISP  (40EA/CA)

## (undated) DEVICE — BOVIE  BLADE 6 EXTENDED - (50/PK)

## (undated) DEVICE — TUBE CONNECT SUCTION CLEAR 120 X 1/4" (50EA/CA)"

## (undated) DEVICE — BLADE SURGICAL #15 - (50/BX 3BX/CA)

## (undated) DEVICE — TOWELS CLOTH SURGICAL - (4/PK 20PK/CA)

## (undated) DEVICE — SUTURE GENERAL

## (undated) DEVICE — KIT ROOM DECONTAMINATION

## (undated) DEVICE — GOWN SURGICAL X-LARGE ULTRA - FILM-REINFORCED (20/CA)

## (undated) DEVICE — PACK MAJOR BASIN - (2EA/CA)

## (undated) DEVICE — KIT ANESTHESIA W/CIRCUIT & 3/LT BAG W/FILTER (20EA/CA)

## (undated) DEVICE — GOWN WARMING STANDARD FLEX - (30/CA)

## (undated) DEVICE — MASK ANESTHESIA ADULT  - (100/CA)

## (undated) DEVICE — CHLORAPREP 26 ML APPLICATOR - ORANGE TINT(25/CA)

## (undated) DEVICE — TUBING CLEARLINK DUO-VENT - C-FLO (48EA/CA)

## (undated) DEVICE — STAPLER 60MM BLUE 3.5MM WITH - STAPLE (3EA/BX)

## (undated) DEVICE — SLEEVE, VASO, THIGH, MED

## (undated) DEVICE — VESSELOOP MAXI BLUE STERILE- SURG-I-LOOP (10EA/BX)

## (undated) DEVICE — STERI STRIP COMPOUND BENZOIN - TINCTURE 0.6ML WITH APPLICATOR (40EA/BX)

## (undated) DEVICE — SUTURE 4-0 PDS II RB-1 (36EA/BX)

## (undated) DEVICE — SUCTION INSTRUMENT YANKAUER BULBOUS TIP W/O VENT (50EA/CA)

## (undated) DEVICE — STAPLER SKIN DISP - (6/BX 10BX/CA) VISISTAT

## (undated) DEVICE — LACTATED RINGERS INJ 1000 ML - (14EA/CA 60CA/PF)

## (undated) DEVICE — DRAIN PENROSE STERILE 1/4 X - 18 IN  (25EA/BX)

## (undated) DEVICE — SUTURE 5-0 PDS RB-1 (36PK/BX)

## (undated) DEVICE — SUTURE 3-0 SILK SH (12PK/BX)

## (undated) DEVICE — HANDPIECE THUNDERBEAT 5MM 20CM FRONT GRIP TYPE S

## (undated) DEVICE — TUBE E-T HI-LO CUFF 7.5MM (10EA/PK)

## (undated) DEVICE — STAPLER 60MM ENDO SHORT ARTICULATING (3EA/BX)

## (undated) DEVICE — SET EXTENSION WITH 2 PORTS (48EA/CA) ***PART #2C8610 IS A SUBSTITUTE*****

## (undated) DEVICE — BAG, SPONGE COUNT 50600

## (undated) DEVICE — PROTECTOR ULNA NERVE - (36PR/CA)

## (undated) DEVICE — SENSOR SPO2 NEO LNCS ADHESIVE (20/BX) SEE USER NOTES

## (undated) DEVICE — CLIP MED INTNL HRZN TI ESCP - (25/BX)

## (undated) DEVICE — TRAY ANESTHESIA - CONTINUOUS EPIDURAL (10EA/CA) THIS IS A CUSTOM PACK

## (undated) DEVICE — NEEDLE NON SAFETY 25 GA X 1 1/2 IN HYPO (100EA/BX)

## (undated) DEVICE — RELOAD WITH GRIPPING SURFACE TECHNOLOGY BLUE 60MM (12EA/BX)

## (undated) DEVICE — KIT RADIAL ARTERY 20GA W/MAX BARRIER AND BIOPATCH  (5EA/CA) #10740 IS FOR THE SET RADIAL ARTERIAL

## (undated) DEVICE — SYRINGE SAFETY 3 ML 18 GA X 1 1/2 BLUNT LL (100/BX 8BX/CA)

## (undated) DEVICE — CLIP LG INTNL HRZN TI ESCP LGT - (20/BX)

## (undated) DEVICE — SYSTEM PREVENA INCISION MNGM

## (undated) DEVICE — RESERVOIR SUCTION 100 CC - SILICONE (20EA/CA)

## (undated) DEVICE — RELOAD WITH GRIPPING SURFACE TECHNOLOGY GOLD 60MM (12EA/BX)

## (undated) DEVICE — PAD LAP STERILE 18 X 18 - (5/PK 40PK/CA)

## (undated) DEVICE — WATER IRRIG. STER. 1500 ML - (9/CA)

## (undated) DEVICE — SURGIFOAM (SIZE 100) - (6EA/CA)

## (undated) DEVICE — GLOVE BIOGEL SZ 8 SURGICAL PF LTX - (50PR/BX 4BX/CA)

## (undated) DEVICE — TUBE NG SALEM SUMP 16FR (50EA/CA)